# Patient Record
Sex: MALE | Race: WHITE | NOT HISPANIC OR LATINO | Employment: OTHER | URBAN - METROPOLITAN AREA
[De-identification: names, ages, dates, MRNs, and addresses within clinical notes are randomized per-mention and may not be internally consistent; named-entity substitution may affect disease eponyms.]

---

## 2017-10-28 PROBLEM — T31.0 BURN (ANY DEGREE) INVOLVING LESS THAN 10% OF BODY SURFACE: Status: ACTIVE | Noted: 2017-10-28

## 2021-06-30 ENCOUNTER — OFFICE VISIT (OUTPATIENT)
Dept: OPTOMETRY | Facility: CLINIC | Age: 61
End: 2021-06-30
Payer: COMMERCIAL

## 2021-06-30 DIAGNOSIS — H01.024 SQUAMOUS BLEPHARITIS OF UPPER EYELIDS OF BOTH EYES: ICD-10-CM

## 2021-06-30 DIAGNOSIS — H01.021 SQUAMOUS BLEPHARITIS OF UPPER EYELIDS OF BOTH EYES: ICD-10-CM

## 2021-06-30 DIAGNOSIS — H04.123 DRY EYE SYNDROME OF BOTH EYES: Primary | ICD-10-CM

## 2021-06-30 DIAGNOSIS — H25.13 NUCLEAR SCLEROSIS OF BOTH EYES: ICD-10-CM

## 2021-06-30 DIAGNOSIS — H52.4 PRESBYOPIA: ICD-10-CM

## 2021-06-30 PROCEDURE — 92015 DETERMINE REFRACTIVE STATE: CPT | Mod: S$GLB,,, | Performed by: OPTOMETRIST

## 2021-06-30 PROCEDURE — 92004 PR EYE EXAM, NEW PATIENT,COMPREHESV: ICD-10-PCS | Mod: S$GLB,,, | Performed by: OPTOMETRIST

## 2021-06-30 PROCEDURE — 99999 PR PBB SHADOW E&M-NEW PATIENT-LVL II: ICD-10-PCS | Mod: PBBFAC,,, | Performed by: OPTOMETRIST

## 2021-06-30 PROCEDURE — 92015 PR REFRACTION: ICD-10-PCS | Mod: S$GLB,,, | Performed by: OPTOMETRIST

## 2021-06-30 PROCEDURE — 99999 PR PBB SHADOW E&M-NEW PATIENT-LVL II: CPT | Mod: PBBFAC,,, | Performed by: OPTOMETRIST

## 2021-06-30 PROCEDURE — 92004 COMPRE OPH EXAM NEW PT 1/>: CPT | Mod: S$GLB,,, | Performed by: OPTOMETRIST

## 2024-03-18 ENCOUNTER — LAB VISIT (OUTPATIENT)
Dept: LAB | Facility: HOSPITAL | Age: 64
End: 2024-03-18
Attending: INTERNAL MEDICINE
Payer: COMMERCIAL

## 2024-03-18 ENCOUNTER — OFFICE VISIT (OUTPATIENT)
Dept: PRIMARY CARE CLINIC | Facility: CLINIC | Age: 64
End: 2024-03-18
Payer: COMMERCIAL

## 2024-03-18 ENCOUNTER — TELEPHONE (OUTPATIENT)
Dept: PRIMARY CARE CLINIC | Facility: CLINIC | Age: 64
End: 2024-03-18

## 2024-03-18 VITALS
HEIGHT: 67 IN | DIASTOLIC BLOOD PRESSURE: 82 MMHG | HEART RATE: 76 BPM | OXYGEN SATURATION: 96 % | BODY MASS INDEX: 27.86 KG/M2 | WEIGHT: 177.5 LBS | TEMPERATURE: 98 F | RESPIRATION RATE: 18 BRPM | SYSTOLIC BLOOD PRESSURE: 120 MMHG

## 2024-03-18 DIAGNOSIS — N28.89 RENAL MASS: ICD-10-CM

## 2024-03-18 DIAGNOSIS — Z00.00 ANNUAL PHYSICAL EXAM: Primary | ICD-10-CM

## 2024-03-18 DIAGNOSIS — N20.0 RENAL STONE: ICD-10-CM

## 2024-03-18 DIAGNOSIS — Z12.5 PROSTATE CANCER SCREENING: ICD-10-CM

## 2024-03-18 DIAGNOSIS — Z00.00 ANNUAL PHYSICAL EXAM: ICD-10-CM

## 2024-03-18 DIAGNOSIS — N20.0 RENAL STONE: Primary | ICD-10-CM

## 2024-03-18 LAB
ALBUMIN SERPL BCP-MCNC: 3.9 G/DL (ref 3.5–5.2)
ALP SERPL-CCNC: 77 U/L (ref 55–135)
ALT SERPL W/O P-5'-P-CCNC: 35 U/L (ref 10–44)
ANION GAP SERPL CALC-SCNC: 8 MMOL/L (ref 8–16)
AST SERPL-CCNC: 24 U/L (ref 10–40)
BASOPHILS # BLD AUTO: 0.05 K/UL (ref 0–0.2)
BASOPHILS NFR BLD: 0.8 % (ref 0–1.9)
BILIRUB SERPL-MCNC: 0.7 MG/DL (ref 0.1–1)
BUN SERPL-MCNC: 20 MG/DL (ref 8–23)
CALCIUM SERPL-MCNC: 9.7 MG/DL (ref 8.7–10.5)
CHLORIDE SERPL-SCNC: 105 MMOL/L (ref 95–110)
CHOLEST SERPL-MCNC: 192 MG/DL (ref 120–199)
CHOLEST/HDLC SERPL: 5.3 {RATIO} (ref 2–5)
CO2 SERPL-SCNC: 26 MMOL/L (ref 23–29)
COMPLEXED PSA SERPL-MCNC: 4.1 NG/ML (ref 0–4)
CREAT SERPL-MCNC: 1.2 MG/DL (ref 0.5–1.4)
DIFFERENTIAL METHOD BLD: ABNORMAL
EOSINOPHIL # BLD AUTO: 0.1 K/UL (ref 0–0.5)
EOSINOPHIL NFR BLD: 1.1 % (ref 0–8)
ERYTHROCYTE [DISTWIDTH] IN BLOOD BY AUTOMATED COUNT: 11.5 % (ref 11.5–14.5)
EST. GFR  (NO RACE VARIABLE): >60 ML/MIN/1.73 M^2
GLUCOSE SERPL-MCNC: 103 MG/DL (ref 70–110)
HCT VFR BLD AUTO: 50.2 % (ref 40–54)
HDLC SERPL-MCNC: 36 MG/DL (ref 40–75)
HDLC SERPL: 18.8 % (ref 20–50)
HGB BLD-MCNC: 17 G/DL (ref 14–18)
IMM GRANULOCYTES # BLD AUTO: 0.02 K/UL (ref 0–0.04)
IMM GRANULOCYTES NFR BLD AUTO: 0.3 % (ref 0–0.5)
LDLC SERPL CALC-MCNC: 133.2 MG/DL (ref 63–159)
LYMPHOCYTES # BLD AUTO: 1 K/UL (ref 1–4.8)
LYMPHOCYTES NFR BLD: 15.4 % (ref 18–48)
MCH RBC QN AUTO: 31.4 PG (ref 27–31)
MCHC RBC AUTO-ENTMCNC: 33.9 G/DL (ref 32–36)
MCV RBC AUTO: 93 FL (ref 82–98)
MONOCYTES # BLD AUTO: 0.4 K/UL (ref 0.3–1)
MONOCYTES NFR BLD: 6.9 % (ref 4–15)
NEUTROPHILS # BLD AUTO: 4.8 K/UL (ref 1.8–7.7)
NEUTROPHILS NFR BLD: 75.5 % (ref 38–73)
NONHDLC SERPL-MCNC: 156 MG/DL
NRBC BLD-RTO: 0 /100 WBC
PLATELET # BLD AUTO: 166 K/UL (ref 150–450)
PMV BLD AUTO: 11.3 FL (ref 9.2–12.9)
POTASSIUM SERPL-SCNC: 4.3 MMOL/L (ref 3.5–5.1)
PROT SERPL-MCNC: 7.4 G/DL (ref 6–8.4)
RBC # BLD AUTO: 5.41 M/UL (ref 4.6–6.2)
SODIUM SERPL-SCNC: 139 MMOL/L (ref 136–145)
TRIGL SERPL-MCNC: 114 MG/DL (ref 30–150)
WBC # BLD AUTO: 6.38 K/UL (ref 3.9–12.7)

## 2024-03-18 PROCEDURE — 85025 COMPLETE CBC W/AUTO DIFF WBC: CPT | Performed by: INTERNAL MEDICINE

## 2024-03-18 PROCEDURE — 80053 COMPREHEN METABOLIC PANEL: CPT | Performed by: INTERNAL MEDICINE

## 2024-03-18 PROCEDURE — 99213 OFFICE O/P EST LOW 20 MIN: CPT | Mod: 25,S$GLB,, | Performed by: INTERNAL MEDICINE

## 2024-03-18 PROCEDURE — 99999 PR PBB SHADOW E&M-EST. PATIENT-LVL IV: CPT | Mod: PBBFAC,,, | Performed by: INTERNAL MEDICINE

## 2024-03-18 PROCEDURE — 99386 PREV VISIT NEW AGE 40-64: CPT | Mod: S$GLB,,, | Performed by: INTERNAL MEDICINE

## 2024-03-18 PROCEDURE — 80061 LIPID PANEL: CPT | Performed by: INTERNAL MEDICINE

## 2024-03-18 PROCEDURE — 36415 COLL VENOUS BLD VENIPUNCTURE: CPT | Performed by: INTERNAL MEDICINE

## 2024-03-18 PROCEDURE — 84153 ASSAY OF PSA TOTAL: CPT | Performed by: INTERNAL MEDICINE

## 2024-03-18 NOTE — LETTER
AUTHORIZATION FOR RELEASE OF   CONFIDENTIAL INFORMATION    Dear Dr Petit ,    We are seeing Lopez Quigley, date of birth 1960, in the clinic at [facility]. Pankaj Winters MD is the patient's PCP. Lopez Quigley has an outstanding lab/procedure at the time we reviewed his chart. In order to help keep his health information updated, he has authorized us to request the following medical record(s):        (  )  MAMMOGRAM                                      ( x )  COLONOSCOPY      (  )  PAP SMEAR                                          (  )  OUTSIDE LAB RESULTS     (  )  DEXA SCAN                                          (  )  EYE EXAM            (  )  FOOT EXAM                                          (  )  ENTIRE RECORD     (  )  OUTSIDE IMMUNIZATIONS                 (  )  _______________         Please fax records to [facility], Dantagnan, Frederick W., MD, [phone]     If you have any questions, please contact [facility] at [phone].           Patient Name: Lopez Quigley  : 1960  Patient Phone #: [phone]

## 2024-03-18 NOTE — TELEPHONE ENCOUNTER
----- Message from Citlaly Barbour sent at 3/18/2024  2:45 PM CDT -----  Contact: Ted/  Patient would like to get a referral.  Referral to what specialty:  Urology  Does the patient want the referral with a specific physician: Dr Augusto Tariq   Is the [facility]  Reason (be specific):  Renal stone, renal mass  Does the patient already have the specialty clinic appointment scheduled: yes  If yes, what date is the appointment scheduled:   03/19/24  Is the insurance listed in Epic correct? (this is important for a referral):  yes  Advised patient that once provider approves this either a nurse or  will return their call?:   Would the patient like a call back, or a response through their [facility] portal?:   phone  Comments:

## 2024-03-18 NOTE — PROGRESS NOTES
[facility] Note    Chief Complaint      Chief Complaint   Patient presents with    Hospital Follow Up    Establish Care       History of Present Illness      Lopez Quigley is a 63 y.o. male who presents today for   Chief Complaint   Patient presents with    Hospital Follow Up    Establish Care   He is here for annual preventative visit with mednichole need full screening labs with this visit . He had colonoscopy with dr bustos in past . He is normally very active with adls and is eating healthy diet as well     Also er f/u for newly diagnosed kidney stone with mild hydronephrosis . Also xray revelaed a 2 cm lesion in inferrior pole of left kidney .         HPI    No problem-specific Assessment & Plan notes found for this encounter.       Problem List Items Addressed This Visit          Renal/    Renal stone    Overview     Cont hydration and urine straining , refer to urology          Renal mass    Overview     Needs referral to urology for eval and treat             Other    Annual physical exam - Primary    Overview     Pe documented need sfull screening labs          Other Visit Diagnoses       Prostate cancer screening                 Past Medical History:  History reviewed. No pertinent past medical history.    Past Surgical History:  History reviewed. No pertinent surgical history.    Family History:  family history is not on file.     Social History:  Social History     Socioeconomic History    Marital status:    Tobacco Use    Smoking status: Never    Smokeless tobacco: Never   Substance and Sexual Activity    Alcohol use: No    Drug use: No    Sexual activity: Yes       Review of Systems:   Review of Systems   Constitutional:  Negative for fever and weight loss.   HENT:  Negative for congestion, hearing loss and sore throat.    Eyes:  Negative for blurred vision.   Respiratory:  Negative for cough and shortness of breath.    Cardiovascular:  Negative for chest pain,  "palpitations, claudication and leg swelling.   Gastrointestinal:  Negative for abdominal pain, constipation, diarrhea and heartburn.   Genitourinary:  Positive for flank pain. Negative for dysuria.   Musculoskeletal:  Negative for back pain and myalgias.   Skin:  Negative for rash.   Neurological:  Negative for focal weakness and headaches.   Psychiatric/Behavioral:  Negative for depression and suicidal ideas. The patient is not nervous/anxious.         Medications:  Outpatient Encounter Medications as of 3/18/2024   Medication Sig Dispense Refill    ketorolac (TORADOL) 10 mg tablet Take 1 tablet (10 mg total) by mouth every 8 (eight) hours as needed for Pain. 15 tablet 0    [DISCONTINUED] meclizine (ANTIVERT) 25 mg tablet Take 1 tablet (25 mg total) by mouth 3 (three) times daily as needed. (Patient not taking: Reported on 3/18/2024) 20 tablet 0    [DISCONTINUED] tamsulosin (FLOMAX) 0.4 mg Cap Take 1 capsule (0.4 mg total) by mouth once daily. (Patient not taking: Reported on 3/18/2024) 30 capsule 0     No facility-administered encounter medications on file as of 3/18/2024.       Allergies:  Review of patient's allergies indicates:  No Known Allergies      Physical Exam      Vitals:    03/18/24 0922   BP: 120/82   Pulse: 76   Resp: 18   Temp: 98 °F (36.7 °C)        Vital Signs  Temp: 98 °F (36.7 °C)  Temp Source: Oral  Pulse: 76  Resp: 18  SpO2: 96 %  BP: 120/82  BP Location: Left arm  Patient Position: Sitting  Pain Score: 0-No pain  Height and Weight  Height: 5' 7" (170.2 cm)  Weight: 80.5 kg (177 lb 7.5 oz)  BSA (Calculated - sq m): 1.95 sq meters  BMI (Calculated): 27.8  Weight in (lb) to have BMI = 25: 159.3]     Body mass index is 27.8 kg/m².    Physical Exam  Constitutional:       Appearance: He is well-developed.   HENT:      Head: Normocephalic.   Eyes:      Pupils: Pupils are equal, round, and reactive to light.   Neck:      Thyroid: No thyromegaly.   Cardiovascular:      Rate and Rhythm: Normal rate and " "regular rhythm.      Heart sounds: No murmur heard.     No friction rub. No gallop.   Pulmonary:      Effort: Pulmonary effort is normal.      Breath sounds: Normal breath sounds.   Abdominal:      General: Bowel sounds are normal.      Palpations: Abdomen is soft.   Musculoskeletal:         General: Normal range of motion.      Cervical back: Normal range of motion.   Skin:     General: Skin is warm and dry.   Neurological:      Mental Status: He is alert and oriented to person, place, and time.      Sensory: No sensory deficit.   Psychiatric:         Behavior: Behavior normal.          Laboratory:  CBC:  No results for input(s): "WBC", "RBC", "HGB", "HCT", "PLT", "MCV", "MCH", "MCHC" in the last 2160 hours.  CMP:  No results for input(s): "GLU", "CALCIUM", "ALBUMIN", "PROT", "NA", "K", "CO2", "CL", "BUN", "ALKPHOS", "ALT", "AST", "BILITOT" in the last 2160 hours.    Invalid input(s): "CREATININ"  URINALYSIS:  Recent Labs   Lab Result Units 03/12/24  2306   Color, UA  Yellow   Specific Gravity, UA  1.020   pH, UA  7.0   Protein, UA  Negative   Nitrite, UA  Negative   Leukocytes, UA  Negative   Urobilinogen, UA EU/dL 2.0-3.0*      LIPIDS:  No results for input(s): "TSH", "HDL", "CHOL", "TRIG", "LDLCALC", "CHOLHDL", "NONHDLCHOL", "TOTALCHOLEST" in the last 2160 hours.  TSH:  No results for input(s): "TSH" in the last 2160 hours.  A1C:  No results for input(s): "HGBA1C" in the last 2160 hours.    Radiology:        Assessment:     Lopez Quigley is a 63 y.o.male with:    Annual physical exam  -     CBC Auto Differential; Future; Expected date: 03/18/2024  -     Comprehensive Metabolic Panel; Future; Expected date: 03/18/2024  -     Lipid Panel; Future; Expected date: 03/18/2024    Renal stone  -     Ambulatory referral/consult to Urology; Future; Expected date: 03/25/2024    Renal mass  -     Ambulatory referral/consult to Urology; Future; Expected date: 03/25/2024    Prostate cancer screening  -     PSA, Screening; " Future; Expected date: 03/18/2024                Plan:     Problem List Items Addressed This Visit          Renal/    Renal stone    Overview     Cont hydration and urine straining , refer to urology          Renal mass    Overview     Needs referral to urology for eval and treat             Other    Annual physical exam - Primary    Overview     Pe documented need sfull screening labs          Other Visit Diagnoses       Prostate cancer screening                As above, continue current medications and maintain follow up with specialists.  Return to clinic in 6 months.      Frederick W Dantagnan [facility]

## 2024-03-19 ENCOUNTER — OFFICE VISIT (OUTPATIENT)
Dept: UROLOGY | Facility: CLINIC | Age: 64
End: 2024-03-19
Payer: COMMERCIAL

## 2024-03-19 ENCOUNTER — PATIENT MESSAGE (OUTPATIENT)
Dept: ADMINISTRATIVE | Facility: HOSPITAL | Age: 64
End: 2024-03-19
Payer: COMMERCIAL

## 2024-03-19 VITALS
DIASTOLIC BLOOD PRESSURE: 87 MMHG | WEIGHT: 178.44 LBS | HEIGHT: 67 IN | SYSTOLIC BLOOD PRESSURE: 145 MMHG | HEART RATE: 61 BPM | BODY MASS INDEX: 28.01 KG/M2

## 2024-03-19 DIAGNOSIS — N20.1 LEFT URETERAL STONE: Primary | ICD-10-CM

## 2024-03-19 DIAGNOSIS — N28.9 RENAL LESION: ICD-10-CM

## 2024-03-19 DIAGNOSIS — N28.89 RENAL MASS: ICD-10-CM

## 2024-03-19 DIAGNOSIS — N20.0 RENAL STONE: ICD-10-CM

## 2024-03-19 PROCEDURE — 99999 PR PBB SHADOW E&M-EST. PATIENT-LVL IV: CPT | Mod: PBBFAC,,, | Performed by: UROLOGY

## 2024-03-19 PROCEDURE — 87086 URINE CULTURE/COLONY COUNT: CPT | Performed by: UROLOGY

## 2024-03-19 PROCEDURE — 99204 OFFICE O/P NEW MOD 45 MIN: CPT | Mod: S$GLB,,, | Performed by: UROLOGY

## 2024-03-19 NOTE — PROGRESS NOTES
Subjective:       Patient ID: Lopez Quigley is a 63 y.o. male.    Chief Complaint: Renal mass and Nephrolithiasis     This is a 63 y.o.  male patient that is new to me.  The patient was referred to me by PCP for left renal mass, left ureteral stone.  CT without contrast 3/12/24 with left 2 mm UVJ stone with mild hydro, left 2.2 cm renal lesion.  Right-sided small nonobstructing stones.  Intermittent left-sided flank discomfort.  No fever or chills.  Labs 03/18/24 creatinine okay at 1.2, white blood cell count normal.  PSA was 4.1.    LAST PSA  Lab Results   Component Value Date    PSA 4.1 (H) 03/18/2024       Lab Results   Component Value Date    CREATININE 1.2 03/18/2024       ---  PMH/PSH/Medications/Allergies/Social history reviewed and as in chart.    Review of Systems   Constitutional:  Negative for activity change, chills and fever.   HENT:  Negative for congestion.    Respiratory:  Negative for cough, chest tightness and shortness of breath.    Cardiovascular:  Negative for chest pain and palpitations.   Gastrointestinal:  Negative for abdominal distention, abdominal pain, nausea and vomiting.   Genitourinary:  Positive for flank pain. Negative for difficulty urinating, hematuria, penile pain, scrotal swelling and testicular pain.   Musculoskeletal:  Negative for gait problem.       Objective:      Physical Exam  Constitutional:       Appearance: Normal appearance.   HENT:      Head: Normocephalic.   Pulmonary:      Effort: Pulmonary effort is normal.      Breath sounds: Normal breath sounds.   Abdominal:      General: Abdomen is flat. Bowel sounds are normal.      Palpations: Abdomen is soft.      Tenderness: There is no abdominal tenderness. There is no right CVA tenderness, left CVA tenderness or guarding.   Musculoskeletal:         General: Normal range of motion.      Cervical back: Normal range of motion.   Skin:     General: Skin is warm.   Neurological:      Mental Status: He is alert.            Results for orders placed or performed during the hospital encounter of 03/12/24 (from the past 2160 hour(s))   CT Renal Stone Study ABD Pelvis WO    Narrative    EXAMINATION:  CT RENAL STONE STUDY ABD PELVIS WO    CLINICAL HISTORY:  Flank pain, kidney stone suspected;    TECHNIQUE:  Low dose axial images, sagittal and coronal reformations were obtained from the lung bases to the pubic symphysis, Oral contrast was not administered.    COMPARISON:  None    FINDINGS:  Lower chest: Gynecomastia.    Heart: Normal in size. No pericardial effusion.  Moderate coronary artery calcification.    Lung Bases: Well aerated, without consolidation or pleural fluid.    Liver: Normal in size and attenuation, with no focal hepatic lesions.    Gallbladder: No calcified gallstones.    Bile Ducts: No evidence of dilated ducts.    Pancreas: No mass or peripancreatic fat stranding.    Spleen: Unremarkable.    Adrenals: Unremarkable.    Kidneys/ Ureters: 2 mm obstructive stone at the left UVJ with resultant mild left-sided hydroureteronephrosis.  Heterogeneous 2.2 x 1.7 cm rounded lesion at the inferior pole left kidney.  Nonobstructive right nephrolithiasis.  Mild bilateral infectious or inflammatory left perinephric fat stranding, left greater than right.    Bladder: No evidence of wall thickening.    Reproductive organs: Enlarged prostate gland abuts the base  of the bladder.    GI Tract/Mesentery: Colonic diverticulosis, without diverticulitis.  No small bowel obstruction.    Peritoneal Space: No ascites. No free air.    Retroperitoneum: No significant adenopathy.    Abdominal wall: Small fat containing umbilical hernia.  Small bilateral fat containing inguinal hernias.    Vasculature: No significant atherosclerosis or aneurysm.    Bones: No acute fracture.      Impression    2 mm obstructive stone at the left UVJ with resultant mild left-sided hydroureteronephrosis.    Heterogeneous 2.2 x 1.7 cm rounded lesion at the inferior pole  left kidney.  Recommend CT or MR renal mass protocol to exclude solid neoplasm.    Nonobstructive right nephrolithiasis.    Mild bilateral infectious or inflammatory left perinephric fat stranding, left greater than right.      Electronically signed by: River-Chris Jennifer  Date:    03/12/2024  Time:    21:50       Assessment:     Problem Noted   Left Ureteral Stone 3/19/2024   Renal Lesion 3/18/2024    2.2 cm left on CT noncontrast 3/24         Plan:     CT reviewed with the patient, small right nonobstructing stones, left distal ureteral stone with mild hydronephrosis, left renal lesion.  Discussed options for small stone:  Ureteroscopy versus observation.  We will need repeat CT scan for left renal lesion.  Plan for CT scan with and without contrast in approximately 1 week with follow up after.  Repeat PSA sometime in future, possibly elevated due to left distal ureteral stone.      David Wong MD    The above referenced imaging and interpretations were personally reviewed.  Disclaimer: This note has been generated using voice-recognition software. There may be typographical errors that have been missed during proof-reading.

## 2024-03-20 DIAGNOSIS — Z12.11 SCREENING FOR COLON CANCER: ICD-10-CM

## 2024-03-20 LAB — BACTERIA UR CULT: NO GROWTH

## 2024-03-27 ENCOUNTER — OFFICE VISIT (OUTPATIENT)
Dept: UROLOGY | Facility: CLINIC | Age: 64
End: 2024-03-27
Payer: COMMERCIAL

## 2024-03-27 ENCOUNTER — PATIENT MESSAGE (OUTPATIENT)
Dept: UROLOGY | Facility: CLINIC | Age: 64
End: 2024-03-27

## 2024-03-27 VITALS
BODY MASS INDEX: 27.3 KG/M2 | HEIGHT: 67 IN | DIASTOLIC BLOOD PRESSURE: 75 MMHG | SYSTOLIC BLOOD PRESSURE: 117 MMHG | WEIGHT: 173.94 LBS | HEART RATE: 65 BPM

## 2024-03-27 DIAGNOSIS — N28.89 LEFT RENAL MASS: Primary | ICD-10-CM

## 2024-03-27 PROCEDURE — 99999 PR PBB SHADOW E&M-EST. PATIENT-LVL III: CPT | Mod: PBBFAC,,, | Performed by: UROLOGY

## 2024-03-27 PROCEDURE — 99215 OFFICE O/P EST HI 40 MIN: CPT | Mod: S$GLB,,, | Performed by: UROLOGY

## 2024-03-27 PROCEDURE — 86850 RBC ANTIBODY SCREEN: CPT | Performed by: UROLOGY

## 2024-03-27 NOTE — PROGRESS NOTES
Subjective:       Patient ID: Lopez Quigley is a 63 y.o. male.    Chief Complaint: Follow-up (CT results)     This is a 63 y.o.  male patient with left renal mass, kidney stones.  CT without contrast 3/12/24 with left 2 mm UVJ stone with mild hydro, left 2.2 cm renal lesion.  Right-sided small nonobstructing stones.  Intermittent left-sided flank discomfort.  No fever or chills.  Labs 03/18/24 creatinine okay at 1.2, white blood cell count normal.  PSA was 4.1.  CT w/wo contrast 3/26/24 with left 2 cm renal mass, no left ureteral stone or hydronephrosis, right non obstructing small stones.    Still intermittent left lower abdominal cramping.      LAST PSA  Lab Results   Component Value Date    PSA 4.1 (H) 03/18/2024       Lab Results   Component Value Date    CREATININE 1.2 03/18/2024       ---  PMH/PSH/Medications/Allergies/Social history reviewed and as in chart.    Review of Systems   Constitutional:  Negative for activity change, chills and fever.   HENT:  Negative for congestion.    Respiratory:  Negative for cough, chest tightness and shortness of breath.    Cardiovascular:  Negative for chest pain and palpitations.   Gastrointestinal:  Negative for abdominal distention, abdominal pain, nausea and vomiting.   Genitourinary:  Positive for flank pain. Negative for difficulty urinating, hematuria, penile pain, scrotal swelling and testicular pain.   Musculoskeletal:  Negative for gait problem.       Objective:      Physical Exam  Constitutional:       Appearance: Normal appearance.   HENT:      Head: Normocephalic.   Pulmonary:      Effort: Pulmonary effort is normal.      Breath sounds: Normal breath sounds.   Abdominal:      General: Abdomen is flat. Bowel sounds are normal.      Palpations: Abdomen is soft.      Tenderness: There is no abdominal tenderness. There is no right CVA tenderness, left CVA tenderness or guarding.   Musculoskeletal:         General: Normal range of motion.      Cervical back:  Normal range of motion.   Skin:     General: Skin is warm.   Neurological:      Mental Status: He is alert.           Results for orders placed or performed during the hospital encounter of 03/26/24 (from the past 2160 hour(s))   CT Abdomen Pelvis W Wo Contrast    Narrative    EXAM:  CT ABDOMEN PELVIS W WO CONTRAST    CLINICAL HISTORY:          left renal lesion, left ureteral stone; [N20.1]-Calculus of ureter./[N28.9]-Disorder of kidney and ureter, unspecified.    TECHNIQUE: Helical CT images through the abdomen and pelvis were obtained before and after intravenous administration of  iodinated contrast. All CT scans at [this location] are performed using dose modulation techniques as appropriate to a performed exam including the following: automated exposure control; adjustment of the mA and/or kV according to patient size (this includes techniques or standardized protocols for targeted exams where dose is matched to indication / reason for exam; i.e. extremities or head); use of iterative reconstruction technique.    COMPARISON: None.    FINDINGS:    Lower chest: No significant abnormality.  Liver: Satisfactory appearance of the liver.  Gallbladder: Unremarkable gallbladder.  Biliary: No significant biliary ductal dilatation.  Pancreas: No significant abnormality of the pancreas, adrenal glands, and spleen.  Adrenal glands: Unremarkable.  Spleen: Unremarkable.  Kidneys/ureters: Cystic and solid exophytic 2.5 x 2.2 cm left renal mass anterior lower pole.  There is a too small to characterize hypodensity in the right kidney statistically representing a cyst.  No hydronephrosis on either side.  There are 2 nonobstructing right renal calculi, the largest measures 2 mm.  No renal calculus on the left.  No hydronephrosis on either side.  No urothelial lesion in the renal pelves or proximal ureters.  The distal ureters and bladder are not included on the delayed imaging.  Bladder: Grossly unremarkable.  Reproductive:  Prostate is enlarged, 5.1 cm.  Bilateral scrotal hydroceles.  Probable vasectomy clips.  Gastrointestinal: No bowel obstruction or inflammation. No intraperitoneal free air or significant free fluid.  The appendix has a normal appearance.  Retroperitoneum: No adenopathy.  Vascular: Nonaneurysmal aorta.  No acute vascular abnormality.  Osseous: No acute fracture or aggressive appearing osseous lesion. Degenerative disc disease most pronounced at the L3-L4 and L4-L5 levels.        Impression    1.    Enhancing cystic and solid left renal mass measures 2.5 x 2.2 cm.  Finding is consistent with malignancy.  No evidence of distal metastasis in the abdomen or pelvis.  2.    Small bilateral scrotal hydroceles.  3.    Other findings as above.    Finalized on: 3/26/2024 11:28 AM By:  Carmelo Hernandez MD  [facility]# [id number]      2024-03-26 11:30:36.957    [facility]   Results for orders placed or performed during the hospital encounter of 03/12/24 (from the past 2160 hour(s))   CT Renal Stone Study ABD Pelvis WO    Narrative    EXAMINATION:  CT RENAL STONE STUDY ABD PELVIS WO    CLINICAL HISTORY:  Flank pain, kidney stone suspected;    TECHNIQUE:  Low dose axial images, sagittal and coronal reformations were obtained from the lung bases to the pubic symphysis, Oral contrast was not administered.    COMPARISON:  None    FINDINGS:  Lower chest: Gynecomastia.    Heart: Normal in size. No pericardial effusion.  Moderate coronary artery calcification.    Lung Bases: Well aerated, without consolidation or pleural fluid.    Liver: Normal in size and attenuation, with no focal hepatic lesions.    Gallbladder: No calcified gallstones.    Bile Ducts: No evidence of dilated ducts.    Pancreas: No mass or peripancreatic fat stranding.    Spleen: Unremarkable.    Adrenals: Unremarkable.    Kidneys/ Ureters: 2 mm obstructive stone at the left UVJ with resultant mild left-sided hydroureteronephrosis.  Heterogeneous 2.2 x 1.7 cm rounded lesion at the  inferior pole left kidney.  Nonobstructive right nephrolithiasis.  Mild bilateral infectious or inflammatory left perinephric fat stranding, left greater than right.    Bladder: No evidence of wall thickening.    Reproductive organs: Enlarged prostate gland abuts the base  of the bladder.    GI Tract/Mesentery: Colonic diverticulosis, without diverticulitis.  No small bowel obstruction.    Peritoneal Space: No ascites. No free air.    Retroperitoneum: No significant adenopathy.    Abdominal wall: Small fat containing umbilical hernia.  Small bilateral fat containing inguinal hernias.    Vasculature: No significant atherosclerosis or aneurysm.    Bones: No acute fracture.      Impression    2 mm obstructive stone at the left UVJ with resultant mild left-sided hydroureteronephrosis.    Heterogeneous 2.2 x 1.7 cm rounded lesion at the inferior pole left kidney.  Recommend CT or MR renal mass protocol to exclude solid neoplasm.    Nonobstructive right nephrolithiasis.    Mild bilateral infectious or inflammatory left perinephric fat stranding, left greater than right.      Electronically signed by: Lisa Rodriguez  Date:    03/12/2024  Time:    21:50       Assessment:     Problem Noted   Left Ureteral Stone 3/19/2024   Left Renal Mass 3/18/2024    2.2 cm left on CT noncontrast 3/24  2.5 cm enhancing cystic and solid mass on CT abd w/wo contrast 3/26/24         Plan:     CT reviewed with the patient, left ureteral stone no longer present, no left hydronephrosis.  2.5 cm left renal mass.    Discussed finding of left small renal mass <3 cm.  Options discussed: observation, ablation/biopsy, extirpation with robotic partial nephrectomy.  Risks, benefits and alternatives of each discussed.  Discussed chances of tumor being benign, follow up after each treatment, success rates, etc.   Given age, health status and tumor characteristics recommend partial nephrectomy.  Wishes to proceed.  To let me know when wish to have  done      I have explained the risk, benefits, and alternatives of the procedure in detail.  The risks including but not limited to bleeding, pseudoaneurysm, AVM, injury to adjacent structures including the spleen, liver, lung, pancreas, colon and intestines, blood vessels in the abdomen including the renal artery or renal vein, ureter, loss of kidney, urinoma or urinary fistula, or need for conversion to open or radical nephrectomy were explained to the patient in depth. The patient voices understanding and all questions have been answered. The patient agrees to proceed as planned with a left robotic partial nephrectomy.        David Wong MD    The above referenced imaging and interpretations were personally reviewed.  Disclaimer: This note has been generated using voice-recognition software. There may be typographical errors that have been missed during proof-reading.

## 2024-03-28 RX ORDER — CEFAZOLIN SODIUM 2 G/50ML
2 SOLUTION INTRAVENOUS
Status: CANCELLED | OUTPATIENT
Start: 2024-03-28

## 2024-03-28 RX ORDER — ACETAMINOPHEN 500 MG
1000 TABLET ORAL
Status: CANCELLED | OUTPATIENT
Start: 2024-03-28

## 2024-03-28 RX ORDER — LIDOCAINE HYDROCHLORIDE 10 MG/ML
1 INJECTION, SOLUTION EPIDURAL; INFILTRATION; INTRACAUDAL; PERINEURAL ONCE
Status: CANCELLED | OUTPATIENT
Start: 2024-03-28 | End: 2024-03-28

## 2024-04-02 LAB — HEMOCCULT STL QL IA: NEGATIVE

## 2024-04-03 ENCOUNTER — TELEPHONE (OUTPATIENT)
Dept: UROLOGY | Facility: CLINIC | Age: 64
End: 2024-04-03
Payer: COMMERCIAL

## 2024-04-03 NOTE — TELEPHONE ENCOUNTER
----- Message from Ankit Sahni sent at 4/2/2024 11:36 AM CDT -----  Contact: pt  Type:  Procedure change    Who Called: pt   Would the patient rather a call back or a response via [facility]? Call/ Eviti  Best Call Back Number: [phone]  Additional Information:    Pt requesting a call regarding change procedure to Monday April 8th

## 2024-04-03 NOTE — TELEPHONE ENCOUNTER
----- Message from Consuelo Jose Rafael sent at 4/3/2024 10:07 AM CDT -----  Type:  Call    Who Called:pt  Does the patient know what this is regarding?:messages in portal  Would the patient rather a call back or a response via [facility]? call  Best Call Back Number: [phone]  Additional Information: Pt stated she needs messages sent through the portal answered.

## 2024-04-29 ENCOUNTER — PATIENT MESSAGE (OUTPATIENT)
Dept: UROLOGY | Facility: CLINIC | Age: 64
End: 2024-04-29
Payer: COMMERCIAL

## 2024-04-30 ENCOUNTER — TELEPHONE (OUTPATIENT)
Dept: PREADMISSION TESTING | Facility: HOSPITAL | Age: 64
End: 2024-04-30
Payer: COMMERCIAL

## 2024-04-30 NOTE — TELEPHONE ENCOUNTER
Spoke to the patient and his wife regarding issues with Anesthesia in the past. He has trouble waking up from anesthesia. The type of anesthesia was propofol. Instructed he would see anesthesia the morning of surgery and this information would be given to Sharel NP with anesthesia. No further questions.

## 2024-05-01 ENCOUNTER — ANESTHESIA EVENT (OUTPATIENT)
Dept: SURGERY | Facility: HOSPITAL | Age: 64
DRG: 658 | End: 2024-05-01
Payer: COMMERCIAL

## 2024-05-01 LAB
OHS QRS DURATION: 84 MS
OHS QTC CALCULATION: 420 MS
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
## 2024-05-02 ENCOUNTER — HOSPITAL ENCOUNTER (INPATIENT)
Facility: HOSPITAL | Age: 64
LOS: 1 days | Discharge: HOME | DRG: 658 | End: 2024-05-03
Attending: UROLOGY | Admitting: UROLOGY
Payer: COMMERCIAL

## 2024-05-02 ENCOUNTER — ANESTHESIA (OUTPATIENT)
Dept: SURGERY | Facility: HOSPITAL | Age: 64
DRG: 658 | End: 2024-05-02
Payer: COMMERCIAL

## 2024-05-02 DIAGNOSIS — N28.89 LEFT RENAL MASS: Primary | ICD-10-CM

## 2024-05-02 LAB
ABO + RH BLD: NORMAL
ANION GAP SERPL CALC-SCNC: 13 MMOL/L (ref 8–16)
BASOPHILS # BLD AUTO: 0.02 K/UL (ref 0–0.2)
BASOPHILS NFR BLD: 0.2 % (ref 0–1.9)
BLD GP AB SCN CELLS X3 SERPL QL: NORMAL
BUN SERPL-MCNC: 15 MG/DL (ref 8–23)
CALCIUM SERPL-MCNC: 8.3 MG/DL (ref 8.7–10.5)
CHLORIDE SERPL-SCNC: 107 MMOL/L (ref 95–110)
CO2 SERPL-SCNC: 21 MMOL/L (ref 23–29)
CREAT SERPL-MCNC: 1.2 MG/DL (ref 0.5–1.4)
DIFFERENTIAL METHOD BLD: ABNORMAL
EOSINOPHIL # BLD AUTO: 0 K/UL (ref 0–0.5)
EOSINOPHIL NFR BLD: 0 % (ref 0–8)
ERYTHROCYTE [DISTWIDTH] IN BLOOD BY AUTOMATED COUNT: 11.6 % (ref 11.5–14.5)
EST. GFR  (NO RACE VARIABLE): >60 ML/MIN/1.73 M^2
GLUCOSE SERPL-MCNC: 151 MG/DL (ref 70–110)
HCT VFR BLD AUTO: 43 % (ref 40–54)
HGB BLD-MCNC: 14.8 G/DL (ref 14–18)
IMM GRANULOCYTES # BLD AUTO: 0.03 K/UL (ref 0–0.04)
IMM GRANULOCYTES NFR BLD AUTO: 0.3 % (ref 0–0.5)
LYMPHOCYTES # BLD AUTO: 0.3 K/UL (ref 1–4.8)
LYMPHOCYTES NFR BLD: 2.4 % (ref 18–48)
MAGNESIUM SERPL-MCNC: 1.6 MG/DL (ref 1.6–2.6)
MCH RBC QN AUTO: 31.2 PG (ref 27–31)
MCHC RBC AUTO-ENTMCNC: 34.4 G/DL (ref 32–36)
MCV RBC AUTO: 91 FL (ref 82–98)
MONOCYTES # BLD AUTO: 0.1 K/UL (ref 0.3–1)
MONOCYTES NFR BLD: 1.1 % (ref 4–15)
NEUTROPHILS # BLD AUTO: 10.7 K/UL (ref 1.8–7.7)
NEUTROPHILS NFR BLD: 96 % (ref 38–73)
NRBC BLD-RTO: 0 /100 WBC
PHOSPHATE SERPL-MCNC: 3.2 MG/DL (ref 2.7–4.5)
PLATELET # BLD AUTO: 125 K/UL (ref 150–450)
PMV BLD AUTO: 10.6 FL (ref 9.2–12.9)
POTASSIUM SERPL-SCNC: 4.1 MMOL/L (ref 3.5–5.1)
RBC # BLD AUTO: 4.74 M/UL (ref 4.6–6.2)
SODIUM SERPL-SCNC: 141 MMOL/L (ref 136–145)
SPECIMEN OUTDATE: NORMAL
WBC # BLD AUTO: 11.12 K/UL (ref 3.9–12.7)

## 2024-05-02 PROCEDURE — 85025 COMPLETE CBC W/AUTO DIFF WBC: CPT | Performed by: UROLOGY

## 2024-05-02 PROCEDURE — 25000003 PHARM REV CODE 250: Performed by: UROLOGY

## 2024-05-02 PROCEDURE — 83735 ASSAY OF MAGNESIUM: CPT | Performed by: UROLOGY

## 2024-05-02 PROCEDURE — 8E0W4CZ ROBOTIC ASSISTED PROCEDURE OF TRUNK REGION, PERCUTANEOUS ENDOSCOPIC APPROACH: ICD-10-PCS | Performed by: UROLOGY

## 2024-05-02 PROCEDURE — C1729 CATH, DRAINAGE: HCPCS | Performed by: UROLOGY

## 2024-05-02 PROCEDURE — D9220A PRA ANESTHESIA: Mod: ANES,,, | Performed by: STUDENT IN AN ORGANIZED HEALTH CARE EDUCATION/TRAINING PROGRAM

## 2024-05-02 PROCEDURE — 63600175 PHARM REV CODE 636 W HCPCS: Performed by: NURSE ANESTHETIST, CERTIFIED REGISTERED

## 2024-05-02 PROCEDURE — 11000001 HC ACUTE MED/SURG PRIVATE ROOM

## 2024-05-02 PROCEDURE — 71000033 HC RECOVERY, INTIAL HOUR: Performed by: UROLOGY

## 2024-05-02 PROCEDURE — 80048 BASIC METABOLIC PNL TOTAL CA: CPT | Performed by: UROLOGY

## 2024-05-02 PROCEDURE — 86901 BLOOD TYPING SEROLOGIC RH(D): CPT | Performed by: UROLOGY

## 2024-05-02 PROCEDURE — 76998 US GUIDE INTRAOP: CPT | Mod: 26,,, | Performed by: UROLOGY

## 2024-05-02 PROCEDURE — 99900035 HC TECH TIME PER 15 MIN (STAT)

## 2024-05-02 PROCEDURE — 71000039 HC RECOVERY, EACH ADD'L HOUR: Performed by: UROLOGY

## 2024-05-02 PROCEDURE — 36000713 HC OR TIME LEV V EA ADD 15 MIN: Performed by: UROLOGY

## 2024-05-02 PROCEDURE — 36000712 HC OR TIME LEV V 1ST 15 MIN: Performed by: UROLOGY

## 2024-05-02 PROCEDURE — 36415 COLL VENOUS BLD VENIPUNCTURE: CPT | Mod: XB | Performed by: UROLOGY

## 2024-05-02 PROCEDURE — 63600175 PHARM REV CODE 636 W HCPCS: Performed by: STUDENT IN AN ORGANIZED HEALTH CARE EDUCATION/TRAINING PROGRAM

## 2024-05-02 PROCEDURE — 84100 ASSAY OF PHOSPHORUS: CPT | Performed by: UROLOGY

## 2024-05-02 PROCEDURE — 63600175 PHARM REV CODE 636 W HCPCS: Performed by: UROLOGY

## 2024-05-02 PROCEDURE — D9220A PRA ANESTHESIA: Mod: CRNA,,, | Performed by: NURSE ANESTHETIST, CERTIFIED REGISTERED

## 2024-05-02 PROCEDURE — 0TB14ZZ EXCISION OF LEFT KIDNEY, PERCUTANEOUS ENDOSCOPIC APPROACH: ICD-10-PCS | Performed by: UROLOGY

## 2024-05-02 PROCEDURE — 63600175 PHARM REV CODE 636 W HCPCS: Mod: JG | Performed by: UROLOGY

## 2024-05-02 PROCEDURE — 50543 LAPARO PARTIAL NEPHRECTOMY: CPT | Mod: LT,,, | Performed by: UROLOGY

## 2024-05-02 PROCEDURE — 94761 N-INVAS EAR/PLS OXIMETRY MLT: CPT

## 2024-05-02 PROCEDURE — 37000009 HC ANESTHESIA EA ADD 15 MINS: Performed by: UROLOGY

## 2024-05-02 PROCEDURE — 94799 UNLISTED PULMONARY SVC/PX: CPT

## 2024-05-02 PROCEDURE — 82570 ASSAY OF URINE CREATININE: CPT | Performed by: UROLOGY

## 2024-05-02 PROCEDURE — 27201423 OPTIME MED/SURG SUP & DEVICES STERILE SUPPLY: Performed by: UROLOGY

## 2024-05-02 PROCEDURE — 25000003 PHARM REV CODE 250: Performed by: NURSE ANESTHETIST, CERTIFIED REGISTERED

## 2024-05-02 PROCEDURE — 37000008 HC ANESTHESIA 1ST 15 MINUTES: Performed by: UROLOGY

## 2024-05-02 RX ORDER — CEFAZOLIN SODIUM 2 G/50ML
2 SOLUTION INTRAVENOUS
Status: DISCONTINUED | OUTPATIENT
Start: 2024-05-02 | End: 2024-05-02

## 2024-05-02 RX ORDER — DOCUSATE SODIUM 100 MG/1
100 CAPSULE, LIQUID FILLED ORAL 2 TIMES DAILY
Status: DISCONTINUED | OUTPATIENT
Start: 2024-05-02 | End: 2024-05-03 | Stop reason: HOSPADM

## 2024-05-02 RX ORDER — INDOCYANINE GREEN AND WATER 25 MG
KIT INJECTION
Status: DISCONTINUED | OUTPATIENT
Start: 2024-05-02 | End: 2024-05-02

## 2024-05-02 RX ORDER — PROCHLORPERAZINE EDISYLATE 5 MG/ML
10 INJECTION INTRAMUSCULAR; INTRAVENOUS EVERY 6 HOURS PRN
Status: DISCONTINUED | OUTPATIENT
Start: 2024-05-02 | End: 2024-05-03 | Stop reason: HOSPADM

## 2024-05-02 RX ORDER — PROPOFOL 10 MG/ML
VIAL (ML) INTRAVENOUS
Status: DISCONTINUED | OUTPATIENT
Start: 2024-05-02 | End: 2024-05-02

## 2024-05-02 RX ORDER — ONDANSETRON HYDROCHLORIDE 2 MG/ML
INJECTION, SOLUTION INTRAVENOUS
Status: DISCONTINUED | OUTPATIENT
Start: 2024-05-02 | End: 2024-05-02

## 2024-05-02 RX ORDER — ACETAMINOPHEN 500 MG
1000 TABLET ORAL EVERY 8 HOURS
Status: DISCONTINUED | OUTPATIENT
Start: 2024-05-02 | End: 2024-05-03 | Stop reason: HOSPADM

## 2024-05-02 RX ORDER — ACETAMINOPHEN 500 MG
1000 TABLET ORAL
Status: COMPLETED | OUTPATIENT
Start: 2024-05-02 | End: 2024-05-02

## 2024-05-02 RX ORDER — LIDOCAINE HYDROCHLORIDE 20 MG/ML
INJECTION INTRAVENOUS
Status: DISCONTINUED | OUTPATIENT
Start: 2024-05-02 | End: 2024-05-02

## 2024-05-02 RX ORDER — HYDROMORPHONE HYDROCHLORIDE 2 MG/ML
1 INJECTION, SOLUTION INTRAMUSCULAR; INTRAVENOUS; SUBCUTANEOUS EVERY 4 HOURS PRN
Status: DISCONTINUED | OUTPATIENT
Start: 2024-05-02 | End: 2024-05-03 | Stop reason: HOSPADM

## 2024-05-02 RX ORDER — FUROSEMIDE 10 MG/ML
INJECTION INTRAMUSCULAR; INTRAVENOUS
Status: DISCONTINUED | OUTPATIENT
Start: 2024-05-02 | End: 2024-05-02

## 2024-05-02 RX ORDER — CEFAZOLIN SODIUM 1 G/3ML
INJECTION, POWDER, FOR SOLUTION INTRAMUSCULAR; INTRAVENOUS
Status: DISCONTINUED | OUTPATIENT
Start: 2024-05-02 | End: 2024-05-02

## 2024-05-02 RX ORDER — ROCURONIUM BROMIDE 10 MG/ML
INJECTION, SOLUTION INTRAVENOUS
Status: DISCONTINUED | OUTPATIENT
Start: 2024-05-02 | End: 2024-05-02

## 2024-05-02 RX ORDER — OXYCODONE HYDROCHLORIDE 5 MG/1
5 TABLET ORAL EVERY 4 HOURS PRN
Status: DISCONTINUED | OUTPATIENT
Start: 2024-05-02 | End: 2024-05-03 | Stop reason: HOSPADM

## 2024-05-02 RX ORDER — FENTANYL CITRATE 50 UG/ML
INJECTION, SOLUTION INTRAMUSCULAR; INTRAVENOUS
Status: DISCONTINUED | OUTPATIENT
Start: 2024-05-02 | End: 2024-05-02

## 2024-05-02 RX ORDER — SODIUM CHLORIDE, SODIUM LACTATE, POTASSIUM CHLORIDE, CALCIUM CHLORIDE 600; 310; 30; 20 MG/100ML; MG/100ML; MG/100ML; MG/100ML
INJECTION, SOLUTION INTRAVENOUS CONTINUOUS
Status: DISCONTINUED | OUTPATIENT
Start: 2024-05-02 | End: 2024-05-03 | Stop reason: HOSPADM

## 2024-05-02 RX ORDER — OXYCODONE HYDROCHLORIDE 5 MG/1
5 TABLET ORAL
Status: DISCONTINUED | OUTPATIENT
Start: 2024-05-02 | End: 2024-05-02 | Stop reason: HOSPADM

## 2024-05-02 RX ORDER — HYDROMORPHONE HYDROCHLORIDE 2 MG/ML
0.2 INJECTION, SOLUTION INTRAMUSCULAR; INTRAVENOUS; SUBCUTANEOUS EVERY 5 MIN PRN
Status: DISCONTINUED | OUTPATIENT
Start: 2024-05-02 | End: 2024-05-02 | Stop reason: HOSPADM

## 2024-05-02 RX ORDER — LIDOCAINE HYDROCHLORIDE 10 MG/ML
1 INJECTION, SOLUTION EPIDURAL; INFILTRATION; INTRACAUDAL; PERINEURAL ONCE
Status: DISCONTINUED | OUTPATIENT
Start: 2024-05-02 | End: 2024-05-02

## 2024-05-02 RX ORDER — PROCHLORPERAZINE EDISYLATE 5 MG/ML
5 INJECTION INTRAMUSCULAR; INTRAVENOUS EVERY 30 MIN PRN
Status: DISCONTINUED | OUTPATIENT
Start: 2024-05-02 | End: 2024-05-02 | Stop reason: HOSPADM

## 2024-05-02 RX ORDER — HYDRALAZINE HYDROCHLORIDE 20 MG/ML
10 INJECTION INTRAMUSCULAR; INTRAVENOUS EVERY 6 HOURS PRN
Status: DISCONTINUED | OUTPATIENT
Start: 2024-05-02 | End: 2024-05-03 | Stop reason: HOSPADM

## 2024-05-02 RX ORDER — SCOLOPAMINE TRANSDERMAL SYSTEM 1 MG/1
1 PATCH, EXTENDED RELEASE TRANSDERMAL ONCE
Status: DISCONTINUED | OUTPATIENT
Start: 2024-05-02 | End: 2024-05-03 | Stop reason: HOSPADM

## 2024-05-02 RX ORDER — DOCUSATE SODIUM 100 MG/1
100 CAPSULE, LIQUID FILLED ORAL 2 TIMES DAILY
Qty: 14 CAPSULE | Refills: 0 | Status: SHIPPED | OUTPATIENT
Start: 2024-05-02 | End: 2024-05-09

## 2024-05-02 RX ORDER — OXYCODONE AND ACETAMINOPHEN 5; 325 MG/1; MG/1
1 TABLET ORAL EVERY 4 HOURS PRN
Qty: 30 TABLET | Refills: 0 | Status: SHIPPED | OUTPATIENT
Start: 2024-05-02 | End: 2024-05-07

## 2024-05-02 RX ORDER — ONDANSETRON HYDROCHLORIDE 2 MG/ML
4 INJECTION, SOLUTION INTRAVENOUS EVERY 8 HOURS PRN
Status: DISCONTINUED | OUTPATIENT
Start: 2024-05-02 | End: 2024-05-03 | Stop reason: HOSPADM

## 2024-05-02 RX ORDER — DEXAMETHASONE SODIUM PHOSPHATE 4 MG/ML
INJECTION, SOLUTION INTRA-ARTICULAR; INTRALESIONAL; INTRAMUSCULAR; INTRAVENOUS; SOFT TISSUE
Status: DISCONTINUED | OUTPATIENT
Start: 2024-05-02 | End: 2024-05-02

## 2024-05-02 RX ORDER — OXYCODONE HYDROCHLORIDE 5 MG/1
10 TABLET ORAL EVERY 4 HOURS PRN
Status: DISCONTINUED | OUTPATIENT
Start: 2024-05-02 | End: 2024-05-03 | Stop reason: HOSPADM

## 2024-05-02 RX ORDER — ACETAMINOPHEN 10 MG/ML
1000 INJECTION, SOLUTION INTRAVENOUS ONCE
Status: ACTIVE | OUTPATIENT
Start: 2024-05-02 | End: 2024-05-03

## 2024-05-02 RX ORDER — MIDAZOLAM HYDROCHLORIDE 1 MG/ML
INJECTION INTRAMUSCULAR; INTRAVENOUS
Status: DISCONTINUED | OUTPATIENT
Start: 2024-05-02 | End: 2024-05-02

## 2024-05-02 RX ORDER — BUPIVACAINE HYDROCHLORIDE 2.5 MG/ML
INJECTION, SOLUTION INFILTRATION; PERINEURAL
Status: DISCONTINUED | OUTPATIENT
Start: 2024-05-02 | End: 2024-05-02 | Stop reason: HOSPADM

## 2024-05-02 RX ORDER — METOPROLOL TARTRATE 1 MG/ML
5 INJECTION, SOLUTION INTRAVENOUS EVERY 6 HOURS PRN
Status: DISCONTINUED | OUTPATIENT
Start: 2024-05-02 | End: 2024-05-03 | Stop reason: HOSPADM

## 2024-05-02 RX ADMIN — OXYCODONE 5 MG: 5 TABLET ORAL at 09:05

## 2024-05-02 RX ADMIN — SODIUM CHLORIDE: 0.9 INJECTION, SOLUTION INTRAVENOUS at 07:05

## 2024-05-02 RX ADMIN — INDOCYANINE GREEN 1.25 MG: KIT INTRAVENOUS at 10:05

## 2024-05-02 RX ADMIN — HYDROMORPHONE HYDROCHLORIDE 0.2 MG: 2 INJECTION, SOLUTION INTRAMUSCULAR; INTRAVENOUS; SUBCUTANEOUS at 12:05

## 2024-05-02 RX ADMIN — ONDANSETRON 4 MG: 2 INJECTION, SOLUTION INTRAMUSCULAR; INTRAVENOUS at 10:05

## 2024-05-02 RX ADMIN — MIDAZOLAM HYDROCHLORIDE 2 MG: 1 INJECTION, SOLUTION INTRAMUSCULAR; INTRAVENOUS at 06:05

## 2024-05-02 RX ADMIN — SCOPALAMINE 1 PATCH: 1 PATCH, EXTENDED RELEASE TRANSDERMAL at 06:05

## 2024-05-02 RX ADMIN — FENTANYL CITRATE 50 MCG: 50 INJECTION INTRAMUSCULAR; INTRAVENOUS at 07:05

## 2024-05-02 RX ADMIN — PROPOFOL 150 MG: 10 INJECTION, EMULSION INTRAVENOUS at 07:05

## 2024-05-02 RX ADMIN — CEFAZOLIN 2 G: 330 INJECTION, POWDER, FOR SOLUTION INTRAMUSCULAR; INTRAVENOUS at 07:05

## 2024-05-02 RX ADMIN — SODIUM CHLORIDE, POTASSIUM CHLORIDE, SODIUM LACTATE AND CALCIUM CHLORIDE: 600; 310; 30; 20 INJECTION, SOLUTION INTRAVENOUS at 03:05

## 2024-05-02 RX ADMIN — FENTANYL CITRATE 50 MCG: 50 INJECTION INTRAMUSCULAR; INTRAVENOUS at 08:05

## 2024-05-02 RX ADMIN — ACETAMINOPHEN 1000 MG: 500 TABLET ORAL at 09:05

## 2024-05-02 RX ADMIN — DOCUSATE SODIUM 100 MG: 100 CAPSULE, LIQUID FILLED ORAL at 09:05

## 2024-05-02 RX ADMIN — ROCURONIUM BROMIDE 10 MG: 10 INJECTION, SOLUTION INTRAVENOUS at 09:05

## 2024-05-02 RX ADMIN — FUROSEMIDE 10 MG: 10 INJECTION, SOLUTION INTRAMUSCULAR; INTRAVENOUS at 10:05

## 2024-05-02 RX ADMIN — SODIUM CHLORIDE, SODIUM LACTATE, POTASSIUM CHLORIDE, AND CALCIUM CHLORIDE: .6; .31; .03; .02 INJECTION, SOLUTION INTRAVENOUS at 08:05

## 2024-05-02 RX ADMIN — LIDOCAINE HYDROCHLORIDE 100 MG: 20 INJECTION, SOLUTION INTRAVENOUS at 07:05

## 2024-05-02 RX ADMIN — SUGAMMADEX 200 MG: 100 INJECTION, SOLUTION INTRAVENOUS at 10:05

## 2024-05-02 RX ADMIN — FENTANYL CITRATE 50 MCG: 50 INJECTION INTRAMUSCULAR; INTRAVENOUS at 10:05

## 2024-05-02 RX ADMIN — CEFAZOLIN 2 G: 2 INJECTION, POWDER, FOR SOLUTION INTRAMUSCULAR; INTRAVENOUS at 11:05

## 2024-05-02 RX ADMIN — ACETAMINOPHEN 1000 MG: 500 TABLET ORAL at 05:05

## 2024-05-02 RX ADMIN — ONDANSETRON 4 MG: 2 INJECTION INTRAMUSCULAR; INTRAVENOUS at 08:05

## 2024-05-02 RX ADMIN — INDOCYANINE GREEN 1.25 MG: KIT INTRAVENOUS at 09:05

## 2024-05-02 RX ADMIN — FENTANYL CITRATE 100 MCG: 50 INJECTION INTRAMUSCULAR; INTRAVENOUS at 07:05

## 2024-05-02 RX ADMIN — ROCURONIUM BROMIDE 20 MG: 10 INJECTION, SOLUTION INTRAVENOUS at 10:05

## 2024-05-02 RX ADMIN — ONDANSETRON 4 MG: 2 INJECTION, SOLUTION INTRAMUSCULAR; INTRAVENOUS at 07:05

## 2024-05-02 RX ADMIN — SODIUM CHLORIDE, SODIUM LACTATE, POTASSIUM CHLORIDE, AND CALCIUM CHLORIDE: .6; .31; .03; .02 INJECTION, SOLUTION INTRAVENOUS at 06:05

## 2024-05-02 RX ADMIN — FENTANYL CITRATE 50 MCG: 50 INJECTION INTRAMUSCULAR; INTRAVENOUS at 11:05

## 2024-05-02 RX ADMIN — ROCURONIUM BROMIDE 50 MG: 10 INJECTION, SOLUTION INTRAVENOUS at 07:05

## 2024-05-02 RX ADMIN — CEFAZOLIN 2 G: 2 INJECTION, POWDER, FOR SOLUTION INTRAMUSCULAR; INTRAVENOUS at 03:05

## 2024-05-02 RX ADMIN — DEXAMETHASONE SODIUM PHOSPHATE 8 MG: 4 INJECTION, SOLUTION INTRA-ARTICULAR; INTRALESIONAL; INTRAMUSCULAR; INTRAVENOUS; SOFT TISSUE at 07:05

## 2024-05-02 NOTE — H&P
Subjective:       Patient ID: Lopez Quigley is a 63 y.o. male.    Chief Complaint: Left renal mass     This is a 63 y.o.  male patient with left renal mass, kidney stones.  CT without contrast 3/12/24 with left 2 mm UVJ stone with mild hydro, left 2.2 cm renal lesion.  Right-sided small nonobstructing stones.  Intermittent left-sided flank discomfort.  No fever or chills.  Labs 03/18/24 creatinine okay at 1.2, white blood cell count normal.  PSA was 4.1.  CT w/wo contrast 3/26/24 with left 2 cm renal mass, no left ureteral stone or hydronephrosis, right non obstructing small stones.      Here for left partial nephrectomy    LAST PSA  Lab Results   Component Value Date    PSA 4.1 (H) 03/18/2024       Lab Results   Component Value Date    CREATININE 1.3 05/01/2024       ---  PMH/PSH/Medications/Allergies/Social history reviewed and as in chart.    Review of Systems   Constitutional:  Negative for activity change, chills and fever.   HENT:  Negative for congestion.    Respiratory:  Negative for cough, chest tightness and shortness of breath.    Cardiovascular:  Negative for chest pain and palpitations.   Gastrointestinal:  Negative for abdominal distention, abdominal pain, nausea and vomiting.   Genitourinary:  Positive for flank pain. Negative for difficulty urinating, hematuria, penile pain, scrotal swelling and testicular pain.   Musculoskeletal:  Negative for gait problem.       Objective:      Physical Exam  Constitutional:       Appearance: Normal appearance.   HENT:      Head: Normocephalic.   Pulmonary:      Effort: Pulmonary effort is normal.      Breath sounds: Normal breath sounds.   Abdominal:      General: Abdomen is flat. Bowel sounds are normal.      Palpations: Abdomen is soft.      Tenderness: There is no abdominal tenderness. There is no right CVA tenderness, left CVA tenderness or guarding.   Musculoskeletal:         General: Normal range of motion.      Cervical back: Normal range of motion.    Skin:     General: Skin is warm.   Neurological:      Mental Status: He is alert.           Results for orders placed or performed during the hospital encounter of 03/26/24 (from the past 2160 hour(s))   CT Abdomen Pelvis W Wo Contrast    Narrative    EXAM:  CT ABDOMEN PELVIS W WO CONTRAST    CLINICAL HISTORY:          left renal lesion, left ureteral stone; [N20.1]-Calculus of ureter./[N28.9]-Disorder of kidney and ureter, unspecified.    TECHNIQUE: Helical CT images through the abdomen and pelvis were obtained before and after intravenous administration of  iodinated contrast. All CT scans at [this location] are performed using dose modulation techniques as appropriate to a performed exam including the following: automated exposure control; adjustment of the mA and/or kV according to patient size (this includes techniques or standardized protocols for targeted exams where dose is matched to indication / reason for exam; i.e. extremities or head); use of iterative reconstruction technique.    COMPARISON: None.    FINDINGS:    Lower chest: No significant abnormality.  Liver: Satisfactory appearance of the liver.  Gallbladder: Unremarkable gallbladder.  Biliary: No significant biliary ductal dilatation.  Pancreas: No significant abnormality of the pancreas, adrenal glands, and spleen.  Adrenal glands: Unremarkable.  Spleen: Unremarkable.  Kidneys/ureters: Cystic and solid exophytic 2.5 x 2.2 cm left renal mass anterior lower pole.  There is a too small to characterize hypodensity in the right kidney statistically representing a cyst.  No hydronephrosis on either side.  There are 2 nonobstructing right renal calculi, the largest measures 2 mm.  No renal calculus on the left.  No hydronephrosis on either side.  No urothelial lesion in the renal pelves or proximal ureters.  The distal ureters and bladder are not included on the delayed imaging.  Bladder: Grossly unremarkable.  Reproductive: Prostate is enlarged, 5.1  cm.  Bilateral scrotal hydroceles.  Probable vasectomy clips.  Gastrointestinal: No bowel obstruction or inflammation. No intraperitoneal free air or significant free fluid.  The appendix has a normal appearance.  Retroperitoneum: No adenopathy.  Vascular: Nonaneurysmal aorta.  No acute vascular abnormality.  Osseous: No acute fracture or aggressive appearing osseous lesion. Degenerative disc disease most pronounced at the L3-L4 and L4-L5 levels.        Impression    1.    Enhancing cystic and solid left renal mass measures 2.5 x 2.2 cm.  Finding is consistent with malignancy.  No evidence of distal metastasis in the abdomen or pelvis.  2.    Small bilateral scrotal hydroceles.  3.    Other findings as above.    Finalized on: 3/26/2024 11:28 AM By:  Carmelo Hernandez MD  [facility]# [id number]      2024-03-26 11:30:[id number]    [facility]   Results for orders placed or performed during the hospital encounter of 03/12/24 (from the past 2160 hour(s))   CT Renal Stone Study ABD Pelvis WO    Narrative    EXAMINATION:  CT RENAL STONE STUDY ABD PELVIS WO    CLINICAL HISTORY:  Flank pain, kidney stone suspected;    TECHNIQUE:  Low dose axial images, sagittal and coronal reformations were obtained from the lung bases to the pubic symphysis, Oral contrast was not administered.    COMPARISON:  None    FINDINGS:  Lower chest: Gynecomastia.    Heart: Normal in size. No pericardial effusion.  Moderate coronary artery calcification.    Lung Bases: Well aerated, without consolidation or pleural fluid.    Liver: Normal in size and attenuation, with no focal hepatic lesions.    Gallbladder: No calcified gallstones.    Bile Ducts: No evidence of dilated ducts.    Pancreas: No mass or peripancreatic fat stranding.    Spleen: Unremarkable.    Adrenals: Unremarkable.    Kidneys/ Ureters: 2 mm obstructive stone at the left UVJ with resultant mild left-sided hydroureteronephrosis.  Heterogeneous 2.2 x 1.7 cm rounded lesion at the inferior pole left kidney.   Nonobstructive right nephrolithiasis.  Mild bilateral infectious or inflammatory left perinephric fat stranding, left greater than right.    Bladder: No evidence of wall thickening.    Reproductive organs: Enlarged prostate gland abuts the base  of the bladder.    GI Tract/Mesentery: Colonic diverticulosis, without diverticulitis.  No small bowel obstruction.    Peritoneal Space: No ascites. No free air.    Retroperitoneum: No significant adenopathy.    Abdominal wall: Small fat containing umbilical hernia.  Small bilateral fat containing inguinal hernias.    Vasculature: No significant atherosclerosis or aneurysm.    Bones: No acute fracture.      Impression    2 mm obstructive stone at the left UVJ with resultant mild left-sided hydroureteronephrosis.    Heterogeneous 2.2 x 1.7 cm rounded lesion at the inferior pole left kidney.  Recommend CT or MR renal mass protocol to exclude solid neoplasm.    Nonobstructive right nephrolithiasis.    Mild bilateral infectious or inflammatory left perinephric fat stranding, left greater than right.      Electronically signed by: Lisa Rodriguez  Date:    03/12/2024  Time:    21:50       Assessment:     Left renal mass      Plan:     OR for left robotic assisted lap partial nephrectomy  The risks, benefits, alteratives of the procedure were discussed with the patient.  The patient was given time for questions, all questions were answered.  Written, informed consent was obtained.          I have explained the risk, benefits, and alternatives of the procedure in detail.  The risks including but not limited to bleeding, pseudoaneurysm, AVM, injury to adjacent structures including the spleen, liver, lung, pancreas, colon and intestines, blood vessels in the abdomen including the renal artery or renal vein, ureter, loss of kidney, urinoma or urinary fistula, or need for conversion to open or radical nephrectomy were explained to the patient in depth. The patient voices  understanding and all questions have been answered. The patient agrees to proceed as planned with a left robotic partial nephrectomy.        Mick Starr MD

## 2024-05-02 NOTE — ANESTHESIA POSTPROCEDURE EVALUATION
Anesthesia Post Evaluation    Patient: Lopez Quigley    Procedure(s) Performed: Procedure(s) (LRB):  ROBOTIC NEPHRECTOMY, PARTIAL (Left)    Final Anesthesia Type: general      Patient location during evaluation: PACU  Patient participation: Yes- Able to Participate  Level of consciousness: awake and alert  Post-procedure vital signs: reviewed and stable  Pain management: adequate  Airway patency: patent  JESSICA mitigation strategies: Multimodal analgesia  PONV status at discharge: No PONV  Anesthetic complications: no      Cardiovascular status: hemodynamically stable  Respiratory status: spontaneous ventilation and room air  Hydration status: euvolemic  Follow-up not needed.              Vitals Value Taken Time   /63 05/02/24 1345   Temp  05/02/24 1347   Pulse 82 05/02/24 1346   Resp 15 05/02/24 1346   SpO2 100 % 05/02/24 1346   Vitals shown include unfiled device data.      No case tracking events are documented in the log.      Pain/Gino Score: Pain Rating Prior to Med Admin: 6 (5/2/2024 12:35 PM)  Gino Score: 8 (5/2/2024  1:30 PM)

## 2024-05-02 NOTE — NURSING
Report received from Wendy in PACU. Patient received in stable condition in no acute distress. Lap sites x4 and LLQ MYRON drain in place, CDI. Bed weight and VS taken. IV Fluids infusing. Will continue to monitor.

## 2024-05-02 NOTE — ANESTHESIA PROCEDURE NOTES
Intubation    Date/Time: 5/2/2024 7:13 AM    Performed by: Sanjana Tejada CRNA  Authorized by: Fab Franco MD    Intubation:     Induction:  Intravenous    Intubated:  Postinduction    Mask Ventilation:  Easy mask    Attempts:  1    Attempted By:  CRNA    Method of Intubation:  Video laryngoscopy    Blade:  Ibrahim 3    Laryngeal View Grade: Grade I - full view of cords      Difficult Airway Encountered?: No      Complications:  None    Airway Device:  Oral endotracheal tube    Airway Device Size:  7.5    Style/Cuff Inflation:  Cuffed (inflated to minimal occlusive pressure)    Inflation Amount (mL):  6    Tube secured:  21    Secured at:  The lips    Placement Verified By:  Capnometry    Complicating Factors:  None    Findings Post-Intubation:  BS equal bilateral and atraumatic/condition of teeth unchanged

## 2024-05-02 NOTE — OP NOTE
OPERATIVE NOTE    DATE OF SURGERY:  5/2/2024    PREOPERATIVE DIAGNOSIS:  Left renal mass.    POSTOPERATIVE DIAGNOSIS:  Left renal mass.    OPERATION PERFORMED:  1.  Robotic assisted laparoscopic left partial nephrectomy.  2.  Intraoperative ultrasound with interpretation.    SURGEON:  David Wong MD    RESIDENT: Dr. Starr, PGY 3    ASSISTANT:  Stew Easton    FINDINGS:  Hilar anatomy: 1 arteries.  1 veins.  2 cm renal mass.  Warm ischemia time: 18 minute.  Collecting system entry noted, gross negative margin.          SPECIMENS:  1. Renal mass    .* No specimens in log *    * No implants in log *    BLOOD LOSS:  150    ANESTHESIA:  General.    COMPLICATIONS:  None.    DRAINS:  16-[place] Suresh catheter.  15 Round MYRON    INDICATIONS FOR OPERATION:  Lopez Quigley is a 63 y.o. M with a left renal mass, 2 cm, suspicious for malignancy.  Options discussed, wished to proceed with robotic partial nephrectomy.  Patient understands risks and wishes to proceed.  Written informed consent was obtained prior to the procedure.  All questions were answered.      DESCRIPTION OF OPERATION:  Informed consent was obtained.  The patient was marked on the left side and then taken to the operating room, placed supine on the operating table.  General anesthesia was provided. SCDs were provided for DVT prophylaxis and IV antibiotics for bacterial prophylaxis.  Suresh catheter was placed to drain the bladder.  OG tube was placed by anesthesia.  The patient was then positioned in right lateral decubitus position with the left flank elevated about 70 degrees.  All pressure points were carefully padded.  The table was flexed slightly.  The left arm was placed in a padded support.  The patient was secured to the table with soft chest, hip, and lower extremity straps.  The patient was prepped and draped in usual sterile fashion.  We had a time-out confirming the patient identification, the planned procedure, the surgical site, and all  present were in agreement.      A Veress needle was placed just lateral to the left rectus belly at the level of the umbilicus. Aspiration, irrigation, and saline drop test confirmed good position.  Opening pressure was less than 9 mmHg. The abdomen was insufflated to 15 mmHg.  Following this, trocar sites were mapped out in a straight line at the mid-axillary line, four 8mm trocars were marked  A 12 mm midline trocar for the assistant superior to the umbilicus was marked.  All trocar sites were infiltrated with liposomal marcaine at the end of the case.       The first port was placed lateral to the umbilicus and the addition ports were placed under visual guidance, and the peritoneal cavity was surveyed with no abnormalities or injuries.  Remaining trocars were placed under laparoscopic visualization.  I used a [employer] device to place 0 Vicryl sutures at the 12 mm trocar site for closure at the end of the case.  The robot was docked.  I placed monopolar veronica in the right arm, PK in the left, and a long articulating grasper in the 4th arm.      The white line of Toldt was incised and the left colon was reflected.  The tail of Gerota's fascia was lifted up and the ureter and gonadal vein were identified.  The gonadal vein was left medial and the ureter was lifted up.  The psoas muscle was identified under the ureter and I followed this plane towards the renal hilum.  The hilar vessels were identified and exposed circumferentially.    At this point, I incised Gerota's fascia and defatted the kidney in the region of the tumor.  Once the tumor was exposed, intraoperative ultrasound was performed.  The mass was imaged and measured, and then excision markings were made using ultrasound. Two bulldog clamps were placed on the renal artery.  Vein was left unclamped.  Warm ischemia time was clocked.  The renal mass was sharply excised.  Margin was grossly clear.  There was entry into the collecting system.  The  deep layer of the renorrhaphy was closed with running V-Loc suture, anchored at each end with a weck and Lapra-Ty.  An additional V-Loc was used to run a second layer and close the collecting system.  The bulldog clamp was removed.  Warm ischemia time was 18 minutes.    A sliding clip renorrhaphy was performed to close the outer layer, using interrupted 2-0 vicryl suture, Weck clips, and Lapra-Ty's.  The capsule edges were nicely approximated.  Tisseel was applied, followed by Floseal and a piece of surgicel.  All needles, bulldogs, and vessel loops were extracted.  The specimen was placed in an Endocatch bag.      I reduced the pneumoperitoneal pressure to 7 mmHg and confirmed hemostasis throughout the surgical field.  The kidney was well perfused and the renorrhaphy was hemostatic.  Gerota's fascia was closed with running 3-0 V-loc suture.      A 15 round MYRON drain was placed lateral to the kidney.    Trocars were removed under direct vision.  The tumor was extracted and sent for pathology.  The extraction site was closed with running #1 PDS sutures.  All the wounds were copiously irrigated.  The skin edges were re approximated with 4-0 Monocryl.  Dermabond was applied.      All sponge, needle, and instrument counts were reported correct.  The patient was awakened from anesthesia and transferred to recovery in stable condition.  There were no complications and the patient tolerated the procedure well.  Operative events were discussed with the patient's family.      Dispo: admit for recovery. MYRON Cr at 1800.        David Wong MD

## 2024-05-02 NOTE — TRANSFER OF CARE
"Anesthesia Transfer of Care Note    Patient: Lopez Quigley    Procedure(s) Performed: Procedure(s) (LRB):  ROBOTIC NEPHRECTOMY, PARTIAL (Left)    Patient location: PACU    Anesthesia Type: general    Transport from OR: Transported from OR on 6-10 L/min O2 by face mask with adequate spontaneous ventilation    Post pain: adequate analgesia    Post assessment: no apparent anesthetic complications    Post vital signs: stable    Level of consciousness: awake, alert and oriented    Nausea/Vomiting: no nausea/vomiting    Complications: none    Transfer of care protocol was followed      Last vitals: Visit Vitals  /69 (BP Location: Right arm, Patient Position: Lying)   Pulse 81   Temp 37.2 °C (98.9 °F) (Tympanic)   Resp 16   Ht 5' 7" (1.702 m)   Wt 77.1 kg (170 lb)   SpO2 96%   BMI 26.63 kg/m²     "

## 2024-05-03 VITALS
HEART RATE: 65 BPM | WEIGHT: 174.19 LBS | TEMPERATURE: 98 F | RESPIRATION RATE: 18 BRPM | HEIGHT: 67 IN | DIASTOLIC BLOOD PRESSURE: 66 MMHG | OXYGEN SATURATION: 96 % | SYSTOLIC BLOOD PRESSURE: 116 MMHG | BODY MASS INDEX: 27.34 KG/M2

## 2024-05-03 DIAGNOSIS — N20.1 LEFT URETERAL STONE: Primary | ICD-10-CM

## 2024-05-03 LAB
ANION GAP SERPL CALC-SCNC: 11 MMOL/L (ref 8–16)
BASOPHILS # BLD AUTO: 0.01 K/UL (ref 0–0.2)
BASOPHILS NFR BLD: 0.1 % (ref 0–1.9)
BODY FLUID SOURCE, CREATININE: NORMAL
BUN SERPL-MCNC: 17 MG/DL (ref 8–23)
CALCIUM SERPL-MCNC: 8.6 MG/DL (ref 8.7–10.5)
CHLORIDE SERPL-SCNC: 105 MMOL/L (ref 95–110)
CO2 SERPL-SCNC: 23 MMOL/L (ref 23–29)
CREAT FLD-MCNC: 1.2 MG/DL
CREAT SERPL-MCNC: 1.6 MG/DL (ref 0.5–1.4)
DIFFERENTIAL METHOD BLD: ABNORMAL
EOSINOPHIL # BLD AUTO: 0 K/UL (ref 0–0.5)
EOSINOPHIL NFR BLD: 0 % (ref 0–8)
ERYTHROCYTE [DISTWIDTH] IN BLOOD BY AUTOMATED COUNT: 11.5 % (ref 11.5–14.5)
EST. GFR  (NO RACE VARIABLE): 49 ML/MIN/1.73 M^2
GLUCOSE SERPL-MCNC: 149 MG/DL (ref 70–110)
HCT VFR BLD AUTO: 40.5 % (ref 40–54)
HGB BLD-MCNC: 13.9 G/DL (ref 14–18)
IMM GRANULOCYTES # BLD AUTO: 0.06 K/UL (ref 0–0.04)
IMM GRANULOCYTES NFR BLD AUTO: 0.5 % (ref 0–0.5)
LYMPHOCYTES # BLD AUTO: 0.5 K/UL (ref 1–4.8)
LYMPHOCYTES NFR BLD: 3.7 % (ref 18–48)
MAGNESIUM SERPL-MCNC: 1.7 MG/DL (ref 1.6–2.6)
MCH RBC QN AUTO: 30.9 PG (ref 27–31)
MCHC RBC AUTO-ENTMCNC: 34.3 G/DL (ref 32–36)
MCV RBC AUTO: 90 FL (ref 82–98)
MONOCYTES # BLD AUTO: 0.9 K/UL (ref 0.3–1)
MONOCYTES NFR BLD: 7.4 % (ref 4–15)
NEUTROPHILS # BLD AUTO: 11.1 K/UL (ref 1.8–7.7)
NEUTROPHILS NFR BLD: 88.3 % (ref 38–73)
NRBC BLD-RTO: 0 /100 WBC
PHOSPHATE SERPL-MCNC: 2.8 MG/DL (ref 2.7–4.5)
PLATELET # BLD AUTO: 136 K/UL (ref 150–450)
PMV BLD AUTO: 11.3 FL (ref 9.2–12.9)
POTASSIUM SERPL-SCNC: 4.4 MMOL/L (ref 3.5–5.1)
RBC # BLD AUTO: 4.5 M/UL (ref 4.6–6.2)
SODIUM SERPL-SCNC: 139 MMOL/L (ref 136–145)
WBC # BLD AUTO: 12.59 K/UL (ref 3.9–12.7)

## 2024-05-03 PROCEDURE — 80048 BASIC METABOLIC PNL TOTAL CA: CPT | Performed by: UROLOGY

## 2024-05-03 PROCEDURE — 83735 ASSAY OF MAGNESIUM: CPT | Performed by: UROLOGY

## 2024-05-03 PROCEDURE — 25000003 PHARM REV CODE 250: Performed by: UROLOGY

## 2024-05-03 PROCEDURE — 84100 ASSAY OF PHOSPHORUS: CPT | Performed by: UROLOGY

## 2024-05-03 PROCEDURE — 51798 US URINE CAPACITY MEASURE: CPT

## 2024-05-03 PROCEDURE — 63600175 PHARM REV CODE 636 W HCPCS: Performed by: UROLOGY

## 2024-05-03 PROCEDURE — 36415 COLL VENOUS BLD VENIPUNCTURE: CPT | Performed by: UROLOGY

## 2024-05-03 PROCEDURE — 94761 N-INVAS EAR/PLS OXIMETRY MLT: CPT

## 2024-05-03 PROCEDURE — 94799 UNLISTED PULMONARY SVC/PX: CPT

## 2024-05-03 PROCEDURE — 99900035 HC TECH TIME PER 15 MIN (STAT)

## 2024-05-03 PROCEDURE — 85025 COMPLETE CBC W/AUTO DIFF WBC: CPT | Performed by: UROLOGY

## 2024-05-03 RX ORDER — ONDANSETRON 4 MG/1
8 TABLET, ORALLY DISINTEGRATING ORAL EVERY 8 HOURS PRN
Qty: 21 TABLET | Refills: 0 | Status: SHIPPED | OUTPATIENT
Start: 2024-05-03 | End: 2024-05-10

## 2024-05-03 RX ADMIN — DOCUSATE SODIUM 100 MG: 100 CAPSULE, LIQUID FILLED ORAL at 08:05

## 2024-05-03 RX ADMIN — SODIUM CHLORIDE, POTASSIUM CHLORIDE, SODIUM LACTATE AND CALCIUM CHLORIDE: 600; 310; 30; 20 INJECTION, SOLUTION INTRAVENOUS at 12:05

## 2024-05-03 RX ADMIN — CEFAZOLIN 2 G: 2 INJECTION, POWDER, FOR SOLUTION INTRAMUSCULAR; INTRAVENOUS at 08:05

## 2024-05-03 RX ADMIN — ACETAMINOPHEN 1000 MG: 500 TABLET ORAL at 05:05

## 2024-05-03 RX ADMIN — OXYCODONE 5 MG: 5 TABLET ORAL at 03:05

## 2024-05-03 RX ADMIN — OXYCODONE 5 MG: 5 TABLET ORAL at 12:05

## 2024-05-03 NOTE — NURSING
Voiding trial completed as ordered.  urinated twice, post void Bladder scan was less than 300 each time. Clear yellow urine was voided out by patient spontaneously. MD notified. Awaiting NP to remove MYRON drain before Discharge.

## 2024-05-03 NOTE — PLAN OF CARE
Discharge orders noted. Additional clinical references attached. Patient's discharge instructions given by bedside RN and reviewed by this VN with patient. Education provided on home care instructions, new and previous medications, diagnosis, when to seek medical attention, and follow-up appointments. New medications delivered by pharmacy. Patient verbalized understanding. Patient's family to provide ride/transportation home. All questions answered. Transport to [facility] requested. Floor nurse notified.

## 2024-05-03 NOTE — PROGRESS NOTES
Urology      POD1    S:  CARLOS, no flatus pain ok, ambulated. No N/V.     O: good uop drain 50 overnight ss    NAD   Abd S/ND/aTTP, inc cdi     Labs ok  MYRON Cr negative     A: s/p left partial nephrectomy    P:    Advance diet, slow IVF, dc pak, pvr x 2  Will remove MYRON at lunch  Likely dc this PM  Fu 5/14/24 with BMP prior     MIRIAM Wong MD

## 2024-05-03 NOTE — PLAN OF CARE
The sw met with the pt and Nessa Quigley([phone] who was at bedside to complete the assessment. The pt's independent with his ADL's and doesn't use dme. The pt's retired and lives in [place] with Nessa Quigley([phone]. The pt still drives but his wife will transport him home at d/c. The pt has a very supportive family. The sw completed the white board in the pt's room with her name and contact info. The sw gave the pt's wife a d/c brochure with her contact info on it. The sw encouraged them to call if they have any further questions or concerns. The sw will continue to follow the pt throughout his transition s of care and will assist with any d/c needs.     [facility] - Med Surg  Initial Discharge Assessment       Primary Care Provider: Pankaj Winters MD    Admission Diagnosis: Left renal mass [N28.89]    Admission Date: 5/2/2024  Expected Discharge Date: 5/3/2024    Transition of Care Barriers: (P) None    Payor: AETNA / Plan: AETNA OPEN CHOICE / Product Type: PPO /     Extended Emergency Contact Information  Primary Emergency Contact: Nessa Quigley  Address: [address] [place] [address] [place] [address] [facility]  Home Phone: [phone]  Relation: Spouse    Discharge Plan A: (P) Home with family  Discharge Plan B: (P) Home Health      [id number] - [place] - [address] [facility]  [address] [place] [address]  Phone: [phone] Fax: [phone]      Initial Assessment (most recent)       Adult Discharge Assessment - 05/03/24 1006          Discharge Assessment    Assessment Type Discharge Planning Assessment (P)      Confirmed/corrected address, phone number and insurance Yes (P)      Confirmed Demographics Correct on Facesheet (P)      Source of Information patient;family (P)      When was your last doctors appointment? -- (P)    about 1 month ago    Communicated NATE with patient/caregiver Yes (P)      Reason For Admission Left renal mass (P)      People in Home  spouse (P)      Do you expect to return to your current living situation? Yes (P)      Do you have help at home or someone to help you manage your care at home? Yes (P)      Who are your caregiver(s) and their phone number(s)? Nessa Quigley(wife)[phone] (P)      Prior to hospitilization cognitive status: Alert/Oriented (P)      Current cognitive status: Alert/Oriented (P)      Walking or Climbing Stairs Difficulty no (P)      Dressing/Bathing Difficulty no (P)      Home Accessibility wheelchair accessible (P)      Home Layout Able to live on 1st floor (P)      Equipment Currently Used at Home none (P)      Readmission within 30 days? No (P)      Patient currently being followed by outpatient case management? No (P)      Do you currently have service(s) that help you manage your care at home? No (P)      Do you take prescription medications? No (P)      Do you have prescription coverage? Yes (P)      Coverage [employer] Open Choice (P)      Do you have any problems affording any of your prescribed medications? No (P)      Is the patient taking medications as prescribed? -- (P)    pt's not on any prescription meds currently    Who is going to help you get home at discharge? Nessa Quigley(wife)[phone] (P)      How do you get to doctors appointments? car, drives self;family or friend will provide (P)      Are you on dialysis? No (P)      Do you take coumadin? No (P)      Discharge Plan A Home with family (P)      Discharge Plan B Home Health (P)      DME Needed Upon Discharge  none (P)      Discharge Plan discussed with: Spouse/sig other;Patient (P)      Name(s) and Number(s) Nessa Quigley(wife)[phone] (P)      Transition of Care Barriers None (P)         Physical Activity    On average, how many days per week do you engage in moderate to strenuous exercise (like a brisk walk)? 0 days (P)      On average, how many minutes do you engage in exercise at this level? 0 min (P)         Financial Resource Strain    How hard is it for you  to pay for the very basics like food, housing, medical care, and heating? Not hard at all (P)         Housing Stability    In the last 12 months, was there a time when you were not able to pay the mortgage or rent on time? No (P)      At any time in the past 12 months, were you homeless or living in a shelter (including now)? No (P)         Transportation Needs    In the past 12 months, has lack of transportation kept you from medical appointments or from getting medications? No (P)      In the past 12 months, has lack of transportation kept you from meetings, work, or from getting things needed for daily living? No (P)         Food Insecurity    Within the past 12 months, you worried that your food would run out before you got the money to buy more. Never true (P)      Within the past 12 months, the food you bought just didn't last and you didn't have money to get more. Never true (P)         Stress    Do you feel stress - tense, restless, nervous, or anxious, or unable to sleep at night because your mind is troubled all the time - these days? To some extent (P)         Alcohol Use    Q1: How often do you have a drink containing alcohol? Never (P)      Q2: How many drinks containing alcohol do you have on a typical day when you are drinking? Patient does not drink (P)      Q3: How often do you have six or more drinks on one occasion? Never (P)         OTHER    Name(s) of People in Home Nessa Quigley(wife)[phone] (P)

## 2024-05-03 NOTE — PLAN OF CARE
Pt AAOx4. PRN zofran given for c/o nausea. PRN oxy given for c/o pain. Medications administered per MAR. LR infusing. On RA. Lap sites x4 intact with dermabond. MYRON drain to bulb suction with sanguineous drainage. Output recorded. Suresh to gravity, draining clear yellow urine. Ambulated in hallway x2. Bed alarm set, side rails raised, and call light in reach. Spouse at bedside.

## 2024-05-03 NOTE — PROGRESS NOTES
The sw met with the pt and his wife in his room. The pt's wife will transport him home at d/c. The sw stressed the importance of going to his hsp f/u's and taking his medications. The pt acknowledged understanding and states he will comply. The pt has no further Case Management needs and is clear to d/c.     Future Appointments   Date Time Provider Department Center   5/14/2024  1:30 PM APPOINTMENT LAB, [facility]   5/14/2024  2:15 PM David Wong MD [facility] UROLOG[facility]    [facility] Med Surg  Discharge Final Note    Primary Care Provider: Pankaj Winters MD    Expected Discharge Date: 5/3/2024    Final Discharge Note (most recent)       Final Note - 05/03/24 1006          Final Note    Assessment Type Discharge Planning Assessment        Post-Acute Status    Coverage Aet[employer] Open Choice                     Important Message from Medicare             Contact Info       David Wong MD   Specialty: Urology    [address]  [address]  [place] [address]   Phone: [phone]       Next Steps: Follow up on 5/14/2024    Instructions: 2:15pm [facility] F/U

## 2024-05-03 NOTE — DISCHARGE SUMMARY
[facility] Surg  Discharge Note  Short Stay    Procedure(s) (LRB):  ROBOTIC NEPHRECTOMY, PARTIAL (Left)      OUTCOME: Patient tolerated treatment/procedure well without complication and is now ready for discharge.    DISPOSITION: Home or Self Care    FINAL DIAGNOSIS:  Left renal mass    FOLLOWUP: In clinic    DISCHARGE INSTRUCTIONS:    Discharge Procedure Orders   Diet Adult Regular     Other restrictions (specify):   Order Comments: No heavy lifting >15lbs or strenuous activity for 4 weeks. No tub baths or swimming for 2 weeks. Walk daily. Do not drive while taking narcotics.     No driving until:   Order Comments: Off pain medications     No dressing needed     Notify your health care provider if you experience any of the following:  temperature >100.4     Notify your health care provider if you experience any of the following:  persistent nausea and vomiting or diarrhea     Notify your health care provider if you experience any of the following:  severe uncontrolled pain     Notify your health care provider if you experience any of the following:   Order Comments: Keep wounds clean and dry. You may shower tomorrow.  Keep wounds clean and dry. You may shower tomorrow.  Do not remove surgical glue, it will fall off on its own.    Call the clinic at [phone] or return to the emergency department if you  have persistent fever >101.4, severe bleeding that does not stop in urine, pain not controlled with medications, severe nausea and vomiting limiting intake of liquids and solids, or any other concerns.         Clinical Reference Documents Added to Patient Instructions         Document    DOCUSATE, ADULT (ENGLISH)    GENERAL ANESTHESIA DISCHARGE INSTRUCTIONS (ENGLISH)    UMAIR-MABRY DRAIN (ENGLISH)    NEPHRECTOMY DISCHARGE INSTRUCTIONS (ENGLISH)    OXYCODONE AND ACETAMINOPHEN, ADULT (ENGLISH)            TIME SPENT ON DISCHARGE: 5 minutes

## 2024-05-05 ENCOUNTER — NURSE TRIAGE (OUTPATIENT)
Dept: ADMINISTRATIVE | Facility: CLINIC | Age: 64
End: 2024-05-05
Payer: COMMERCIAL

## 2024-05-05 NOTE — TELEPHONE ENCOUNTER
Pt had partial nephrectomy with Dr Dennison on Thursday. Pt showered today for 1st time. There is surgical tape over one of the insertion sites that was used to secure gauze over where the MYRON drain was removed prior to discharge. Pt's wife concerned that the site under the tape should be open to air, it was closed with surgical glue. Verified discharge paperwork for site care. Care advice to treat at home. Instructed pt's wife to gently remove tape with water carefully securing the skin from pulling. Instructed to call back with additional questions or concerns. Pt and his wife verified understanding.   Reason for Disposition   Skin tape (e.g., Steri-Strips) removal, questions about    Additional Information   Negative: [1] Major abdominal surgical incision AND [2] wound gaping open AND [3] visible internal organs   Negative: Sounds like a life-threatening emergency to the triager   Negative: [1] Bleeding from incision AND [2] won't stop after 10 minutes of direct pressure   Negative: [1] Bleeding (more than a few drops) from incision AND [2] tracheostomy or blood vessel surgery (e.g., carotid endarterectomy, femoral bypass graft, kidney dialysis fistula)   Negative: [1] Widespread rash AND [2] bright red, sunburn-like   Negative: Severe pain in the incision   Negative: [1] Incision gaping open AND [2] < 48 hours since wound re-opened   Negative: [1] Incision gaping open AND [2] length of opening > 2 inches (5 cm)   Negative: Patient sounds very sick or weak to the triager   Negative: Sounds like a serious complication to the triager   Negative: Fever > 100.4 F (38.0 C)   Negative: [1] Incision looks infected (spreading redness, pain) AND [2] fever > 99.5 F (37.5 C)   Negative: [1] Incision looks infected (spreading redness, pain) AND [2] large red area (> 2 in. or 5 cm)   Negative: [1] Incision looks infected (spreading redness, pain) AND [2] face wound   Negative: [1] Red streak runs from the incision AND [2]  longer than 1 inch (2.5 cm)   Negative: [1] Pus or bad-smelling fluid draining from incision AND [2] no fever   Negative: [1] Post-op pain AND [2] not controlled with pain medications   Negative: Dressing soaked with blood or body fluid (e.g., drainage)   Negative: [1] Scant bleeding (e.g., few drops) from incision AND AND [2] tracheostomy or blood vessel surgery (e.g., carotid endarterectomy, femoral bypass graft, kidney dialysis fistula)   Negative: [1] Raised bruise and [2] size > 2 inches (5 cm) and expanding   Negative: [1] Caller has URGENT question AND [2] triager unable to answer question   Negative: [1] INCREASING pain in incision AND [2] > 2 days (48 hours) since surgery   Negative: [1] Small red area or streak AND [2] no fever   Negative: [1] Clear or blood-tinged fluid draining from wound AND [2] no fever   Negative: [1] Incision gaping open AND [2] > 48 hours since wound re-opened AND [3] length of opening > 1/2 inch (12 mm)   Negative: [1] Incision on face gaping open AND [2] > 48 hours since wound re-opened AND [3] length of opening > 1/4 inch (6 mm)   Negative: Suture or staple removal is overdue   Negative: [1] Suture or staple came out early AND [2] caller wants wound checked   Negative: [1] Caller has NON-URGENT question AND [2] triager unable to answer question   Negative: Pimple where a stitch comes through the skin   Negative: Suture (or staple) came out early   Negative: Mild bruising near incision site   Negative: Suture removal date, questions about    Protocols used: Post-Op Incision Symptoms and [id number]

## 2024-05-07 ENCOUNTER — PATIENT MESSAGE (OUTPATIENT)
Dept: UROLOGY | Facility: CLINIC | Age: 64
End: 2024-05-07
Payer: COMMERCIAL

## 2024-05-09 ENCOUNTER — PATIENT MESSAGE (OUTPATIENT)
Dept: PRIMARY CARE CLINIC | Facility: CLINIC | Age: 64
End: 2024-05-09
Payer: COMMERCIAL

## 2024-05-09 LAB
COMMENT: NORMAL
FINAL PATHOLOGIC DIAGNOSIS: NORMAL
GROSS: NORMAL
Lab: NORMAL

## 2024-05-13 PROBLEM — C64.2 CLEAR CELL RENAL CELL CARCINOMA, LEFT: Status: ACTIVE | Noted: 2024-03-18

## 2024-05-13 NOTE — PROGRESS NOTES
Subjective:       Patient ID: Lopez Quigley is a 63 y.o. male.    Chief Complaint: s/p partial nephrectomy     This is a 63 y.o.  male patient with left renal mass, kidney stones.  CT without contrast 3/12/24 with left 2 mm UVJ stone with mild hydro, left 2.2 cm renal lesion.  Right-sided small nonobstructing stones.  Intermittent left-sided flank discomfort.  No fever or chills.  Labs 03/18/24 creatinine okay at 1.2, white blood cell count normal.  PSA was 4.1.  CT w/wo contrast 3/26/24 with left 2 cm renal mass, no left ureteral stone or hydronephrosis, right non obstructing small stones.    Still intermittent left lower abdominal cramping.      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    Doing well.      LAST PSA  Lab Results   Component Value Date    PSA 4.1 (H) 03/18/2024       Lab Results   Component Value Date    CREATININE 1.6 (H) 05/03/2024       ---  PMH/PSH/Medications/Allergies/Social history reviewed and as in chart.    Review of Systems   Constitutional:  Negative for activity change, chills and fever.   HENT:  Negative for congestion.    Respiratory:  Negative for cough, chest tightness and shortness of breath.    Cardiovascular:  Negative for chest pain and palpitations.   Gastrointestinal:  Negative for abdominal distention, abdominal pain, nausea and vomiting.   Genitourinary:  Positive for flank pain. Negative for difficulty urinating, hematuria, penile pain, scrotal swelling and testicular pain.   Musculoskeletal:  Negative for gait problem.       Objective:      Physical Exam  Constitutional:       Appearance: Normal appearance.   HENT:      Head: Normocephalic.   Pulmonary:      Effort: Pulmonary effort is normal.      Breath sounds: Normal breath sounds.   Abdominal:      General: Abdomen is flat. Bowel sounds are normal.      Palpations: Abdomen is soft.       Tenderness: There is no abdominal tenderness. There is no right CVA tenderness, left CVA tenderness or guarding.   Musculoskeletal:         General: Normal range of motion.      Cervical back: Normal range of motion.   Skin:     General: Skin is warm.   Neurological:      Mental Status: He is alert.           Results for orders placed or performed during the hospital encounter of 03/26/24 (from the past 2160 hour(s))   CT Abdomen Pelvis W Wo Contrast    Narrative    EXAM:  CT ABDOMEN PELVIS W WO CONTRAST    CLINICAL HISTORY:          left renal lesion, left ureteral stone; [N20.1]-Calculus of ureter./[N28.9]-Disorder of kidney and ureter, unspecified.    TECHNIQUE: Helical CT images through the abdomen and pelvis were obtained before and after intravenous administration of  iodinated contrast. All CT scans at [this location] are performed using dose modulation techniques as appropriate to a performed exam including the following: automated exposure control; adjustment of the mA and/or kV according to patient size (this includes techniques or standardized protocols for targeted exams where dose is matched to indication / reason for exam; i.e. extremities or head); use of iterative reconstruction technique.    COMPARISON: None.    FINDINGS:    Lower chest: No significant abnormality.  Liver: Satisfactory appearance of the liver.  Gallbladder: Unremarkable gallbladder.  Biliary: No significant biliary ductal dilatation.  Pancreas: No significant abnormality of the pancreas, adrenal glands, and spleen.  Adrenal glands: Unremarkable.  Spleen: Unremarkable.  Kidneys/ureters: Cystic and solid exophytic 2.5 x 2.2 cm left renal mass anterior lower pole.  There is a too small to characterize hypodensity in the right kidney statistically representing a cyst.  No hydronephrosis on either side.  There are 2 nonobstructing right renal calculi, the largest measures 2 mm.  No renal calculus on the left.  No hydronephrosis on either  side.  No urothelial lesion in the renal pelves or proximal ureters.  The distal ureters and bladder are not included on the delayed imaging.  Bladder: Grossly unremarkable.  Reproductive: Prostate is enlarged, 5.1 cm.  Bilateral scrotal hydroceles.  Probable vasectomy clips.  Gastrointestinal: No bowel obstruction or inflammation. No intraperitoneal free air or significant free fluid.  The appendix has a normal appearance.  Retroperitoneum: No adenopathy.  Vascular: Nonaneurysmal aorta.  No acute vascular abnormality.  Osseous: No acute fracture or aggressive appearing osseous lesion. Degenerative disc disease most pronounced at the L3-L4 and L4-L5 levels.        Impression    1.    Enhancing cystic and solid left renal mass measures 2.5 x 2.2 cm.  Finding is consistent with malignancy.  No evidence of distal metastasis in the abdomen or pelvis.  2.    Small bilateral scrotal hydroceles.  3.    Other findings as above.    Finalized on: 3/26/2024 11:28 AM By:  Carmelo Hernandez MD  [facility]# [id number]      2024-03-26 11:30:36.957    [facility]   Results for orders placed or performed during the hospital encounter of 03/12/24 (from the past 2160 hour(s))   CT Renal Stone Study ABD Pelvis WO    Narrative    EXAMINATION:  CT RENAL STONE STUDY ABD PELVIS WO    CLINICAL HISTORY:  Flank pain, kidney stone suspected;    TECHNIQUE:  Low dose axial images, sagittal and coronal reformations were obtained from the lung bases to the pubic symphysis, Oral contrast was not administered.    COMPARISON:  None    FINDINGS:  Lower chest: Gynecomastia.    Heart: Normal in size. No pericardial effusion.  Moderate coronary artery calcification.    Lung Bases: Well aerated, without consolidation or pleural fluid.    Liver: Normal in size and attenuation, with no focal hepatic lesions.    Gallbladder: No calcified gallstones.    Bile Ducts: No evidence of dilated ducts.    Pancreas: No mass or peripancreatic fat stranding.    Spleen:  Unremarkable.    Adrenals: Unremarkable.    Kidneys/ Ureters: 2 mm obstructive stone at the left UVJ with resultant mild left-sided hydroureteronephrosis.  Heterogeneous 2.2 x 1.7 cm rounded lesion at the inferior pole left kidney.  Nonobstructive right nephrolithiasis.  Mild bilateral infectious or inflammatory left perinephric fat stranding, left greater than right.    Bladder: No evidence of wall thickening.    Reproductive organs: Enlarged prostate gland abuts the base  of the bladder.    GI Tract/Mesentery: Colonic diverticulosis, without diverticulitis.  No small bowel obstruction.    Peritoneal Space: No ascites. No free air.    Retroperitoneum: No significant adenopathy.    Abdominal wall: Small fat containing umbilical hernia.  Small bilateral fat containing inguinal hernias.    Vasculature: No significant atherosclerosis or aneurysm.    Bones: No acute fracture.      Impression    2 mm obstructive stone at the left UVJ with resultant mild left-sided hydroureteronephrosis.    Heterogeneous 2.2 x 1.7 cm rounded lesion at the inferior pole left kidney.  Recommend CT or MR renal mass protocol to exclude solid neoplasm.    Nonobstructive right nephrolithiasis.    Mild bilateral infectious or inflammatory left perinephric fat stranding, left greater than right.      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   Surgical margins are negative for tumor.       Assessment:     Problem Noted   Renal Mass 3/18/2024    2.2 cm left on CT noncontrast 3/24  2.5 cm enhancing cystic and solid mass on CT abd w/wo contrast 3/26/24  Left robotic assisted lap partial nephrectomy 5/2/24.   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           Plan:     Path  reviewed  Follow up in 1 months with labs   Follow up BMP        David Wong MD    The above referenced imaging and interpretations were personally reviewed.  Disclaimer: This note has been generated using voice-recognition software. There may be typographical errors that have been missed during proof-reading.             No

## 2024-05-14 ENCOUNTER — OFFICE VISIT (OUTPATIENT)
Dept: UROLOGY | Facility: CLINIC | Age: 64
End: 2024-05-14
Payer: COMMERCIAL

## 2024-05-14 ENCOUNTER — LAB VISIT (OUTPATIENT)
Dept: LAB | Facility: HOSPITAL | Age: 64
End: 2024-05-14
Attending: UROLOGY
Payer: COMMERCIAL

## 2024-05-14 VITALS
HEIGHT: 67 IN | SYSTOLIC BLOOD PRESSURE: 118 MMHG | WEIGHT: 167.88 LBS | DIASTOLIC BLOOD PRESSURE: 80 MMHG | BODY MASS INDEX: 26.35 KG/M2 | HEART RATE: 75 BPM

## 2024-05-14 DIAGNOSIS — N20.0 RENAL STONE: ICD-10-CM

## 2024-05-14 DIAGNOSIS — N20.1 LEFT URETERAL STONE: ICD-10-CM

## 2024-05-14 DIAGNOSIS — N28.89 RENAL MASS: Primary | ICD-10-CM

## 2024-05-14 LAB
ANION GAP SERPL CALC-SCNC: 11 MMOL/L (ref 8–16)
BUN SERPL-MCNC: 18 MG/DL (ref 8–23)
CALCIUM SERPL-MCNC: 9.5 MG/DL (ref 8.7–10.5)
CHLORIDE SERPL-SCNC: 106 MMOL/L (ref 95–110)
CO2 SERPL-SCNC: 25 MMOL/L (ref 23–29)
CREAT SERPL-MCNC: 1.3 MG/DL (ref 0.5–1.4)
EST. GFR  (NO RACE VARIABLE): >60 ML/MIN/1.73 M^2
GLUCOSE SERPL-MCNC: 71 MG/DL (ref 70–110)
POTASSIUM SERPL-SCNC: 4.1 MMOL/L (ref 3.5–5.1)
SODIUM SERPL-SCNC: 142 MMOL/L (ref 136–145)

## 2024-05-14 PROCEDURE — 80048 BASIC METABOLIC PNL TOTAL CA: CPT | Performed by: UROLOGY

## 2024-05-14 PROCEDURE — 36415 COLL VENOUS BLD VENIPUNCTURE: CPT | Performed by: UROLOGY

## 2024-05-14 PROCEDURE — 99999 PR PBB SHADOW E&M-EST. PATIENT-LVL III: CPT | Mod: PBBFAC,,, | Performed by: UROLOGY

## 2024-05-14 PROCEDURE — 99024 POSTOP FOLLOW-UP VISIT: CPT | Mod: S$GLB,,, | Performed by: UROLOGY

## 2024-05-14 RX ORDER — HYDROXYZINE HYDROCHLORIDE 25 MG/1
25 TABLET, FILM COATED ORAL
COMMUNITY
Start: 2024-05-10 | End: 2024-06-14

## 2024-05-14 RX ORDER — TRIAMCINOLONE ACETONIDE 1 MG/G
1 OINTMENT TOPICAL 2 TIMES DAILY
COMMUNITY
Start: 2024-05-10 | End: 2024-06-14

## 2024-06-13 NOTE — PROGRESS NOTES
Subjective:       Patient ID: Lopez Quigley is a 63 y.o. male.    Chief Complaint: Post-op Evaluation     This is a 63 y.o.  male patient with left renal mass, kidney stones.  CT without contrast 3/12/24 with left 2 mm UVJ stone with mild hydro, left 2.2 cm renal lesion.  Right-sided small nonobstructing stones.  Intermittent left-sided flank discomfort.  No fever or chills.  Labs 03/18/24 creatinine okay at 1.2, white blood cell count normal.  PSA was 4.1.  CT w/wo contrast 3/26/24 with left 2 cm renal mass, no left ureteral stone or hydronephrosis, right non obstructing small stones.    Still intermittent left lower abdominal cramping.      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    Doing well.      Labs  6/24: Creatinine 1.3, GFR greater than 60.  Near preop baseline, H and H okay.    LAST PSA  Lab Results   Component Value Date    PSA 4.1 (H) 03/18/2024       Lab Results   Component Value Date    CREATININE 1.3 06/13/2024       ---  PMH/PSH/Medications/Allergies/Social history reviewed and as in chart.    Review of Systems   Constitutional:  Negative for activity change, chills and fever.   HENT:  Negative for congestion.    Respiratory:  Negative for cough, chest tightness and shortness of breath.    Cardiovascular:  Negative for chest pain and palpitations.   Gastrointestinal:  Negative for abdominal distention, abdominal pain, nausea and vomiting.   Genitourinary:  Positive for flank pain. Negative for difficulty urinating, hematuria, penile pain, scrotal swelling and testicular pain.   Musculoskeletal:  Negative for gait problem.       Objective:      Physical Exam  Constitutional:       Appearance: Normal appearance.   HENT:      Head: Normocephalic.   Pulmonary:      Effort: Pulmonary effort is normal.      Breath sounds: Normal breath sounds.   Abdominal:      General:  Abdomen is flat. Bowel sounds are normal.      Palpations: Abdomen is soft.      Tenderness: There is no abdominal tenderness. There is no right CVA tenderness, left CVA tenderness or guarding.   Musculoskeletal:         General: Normal range of motion.      Cervical back: Normal range of motion.   Skin:     General: Skin is warm.   Neurological:      Mental Status: He is alert.           Results for orders placed or performed during the hospital encounter of 03/26/24 (from the past 2160 hour(s))   CT Abdomen Pelvis W Wo Contrast    Narrative    EXAM:  CT ABDOMEN PELVIS W WO CONTRAST    CLINICAL HISTORY:          left renal lesion, left ureteral stone; [N20.1]-Calculus of ureter./[N28.9]-Disorder of kidney and ureter, unspecified.    TECHNIQUE: Helical CT images through the abdomen and pelvis were obtained before and after intravenous administration of  iodinated contrast. All CT scans at [this location] are performed using dose modulation techniques as appropriate to a performed exam including the following: automated exposure control; adjustment of the mA and/or kV according to patient size (this includes techniques or standardized protocols for targeted exams where dose is matched to indication / reason for exam; i.e. extremities or head); use of iterative reconstruction technique.    COMPARISON: None.    FINDINGS:    Lower chest: No significant abnormality.  Liver: Satisfactory appearance of the liver.  Gallbladder: Unremarkable gallbladder.  Biliary: No significant biliary ductal dilatation.  Pancreas: No significant abnormality of the pancreas, adrenal glands, and spleen.  Adrenal glands: Unremarkable.  Spleen: Unremarkable.  Kidneys/ureters: Cystic and solid exophytic 2.5 x 2.2 cm left renal mass anterior lower pole.  There is a too small to characterize hypodensity in the right kidney statistically representing a cyst.  No hydronephrosis on either side.  There are 2 nonobstructing right renal calculi, the  largest measures 2 mm.  No renal calculus on the left.  No hydronephrosis on either side.  No urothelial lesion in the renal pelves or proximal ureters.  The distal ureters and bladder are not included on the delayed imaging.  Bladder: Grossly unremarkable.  Reproductive: Prostate is enlarged, 5.1 cm.  Bilateral scrotal hydroceles.  Probable vasectomy clips.  Gastrointestinal: No bowel obstruction or inflammation. No intraperitoneal free air or significant free fluid.  The appendix has a normal appearance.  Retroperitoneum: No adenopathy.  Vascular: Nonaneurysmal aorta.  No acute vascular abnormality.  Osseous: No acute fracture or aggressive appearing osseous lesion. Degenerative disc disease most pronounced at the L3-L4 and L4-L5 levels.        Impression    1.    Enhancing cystic and solid left renal mass measures 2.5 x 2.2 cm.  Finding is consistent with malignancy.  No evidence of distal metastasis in the abdomen or pelvis.  2.    Small bilateral scrotal hydroceles.  3.    Other findings as above.    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   Surgical margins are negative for tumor.       Assessment:     Problem Noted   Renal Mass 3/18/2024    2.2 cm left on CT noncontrast 3/24  2.5 cm enhancing cystic and solid mass on CT abd w/wo contrast 3/26/24  Left robotic assisted lap partial nephrectomy 5/2/24.   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           Plan:     Labs reviewed look good.  Follow up 1 year postop with CT scan, chest x-ray and labs.      David Wong MD    The above referenced imaging and interpretations were personally reviewed.  Disclaimer: This note has been generated using  voice-recognition software. There may be typographical errors that have been missed during proof-reading.

## 2024-06-14 ENCOUNTER — OFFICE VISIT (OUTPATIENT)
Dept: UROLOGY | Facility: CLINIC | Age: 64
End: 2024-06-14
Payer: COMMERCIAL

## 2024-06-14 VITALS
HEART RATE: 62 BPM | SYSTOLIC BLOOD PRESSURE: 117 MMHG | WEIGHT: 173.19 LBS | DIASTOLIC BLOOD PRESSURE: 74 MMHG | HEIGHT: 67 IN | BODY MASS INDEX: 27.18 KG/M2

## 2024-06-14 DIAGNOSIS — N28.89 RENAL MASS: Primary | ICD-10-CM

## 2024-06-14 PROCEDURE — 99999 PR PBB SHADOW E&M-EST. PATIENT-LVL III: CPT | Mod: PBBFAC,,, | Performed by: UROLOGY

## 2024-06-14 PROCEDURE — 99024 POSTOP FOLLOW-UP VISIT: CPT | Mod: S$GLB,,, | Performed by: UROLOGY

## 2024-06-17 PROBLEM — Z00.00 ANNUAL PHYSICAL EXAM: Status: RESOLVED | Noted: 2024-03-18 | Resolved: 2024-06-17

## 2024-10-24 ENCOUNTER — PATIENT MESSAGE (OUTPATIENT)
Dept: RESEARCH | Facility: HOSPITAL | Age: 64
End: 2024-10-24
Payer: COMMERCIAL

## 2025-05-05 ENCOUNTER — OFFICE VISIT (OUTPATIENT)
Dept: UROLOGY | Facility: CLINIC | Age: 65
End: 2025-05-05
Payer: COMMERCIAL

## 2025-05-05 ENCOUNTER — RESULTS FOLLOW-UP (OUTPATIENT)
Dept: UROLOGY | Facility: CLINIC | Age: 65
End: 2025-05-05

## 2025-05-05 ENCOUNTER — LAB VISIT (OUTPATIENT)
Dept: LAB | Facility: HOSPITAL | Age: 65
End: 2025-05-05
Attending: UROLOGY
Payer: COMMERCIAL

## 2025-05-05 VITALS — HEIGHT: 67 IN | WEIGHT: 178.56 LBS | BODY MASS INDEX: 28.02 KG/M2

## 2025-05-05 DIAGNOSIS — R97.20 ELEVATED PSA: ICD-10-CM

## 2025-05-05 DIAGNOSIS — N28.89 RENAL MASS: ICD-10-CM

## 2025-05-05 DIAGNOSIS — N28.89 RENAL MASS: Primary | ICD-10-CM

## 2025-05-05 DIAGNOSIS — R97.20 ELEVATED PSA: Primary | ICD-10-CM

## 2025-05-05 LAB
ABSOLUTE EOSINOPHIL (OHS): 0.07 K/UL
ABSOLUTE MONOCYTE (OHS): 0.4 K/UL (ref 0.3–1)
ABSOLUTE NEUTROPHIL COUNT (OHS): 3.64 K/UL (ref 1.8–7.7)
ANION GAP (OHS): 9 MMOL/L (ref 8–16)
BASOPHILS # BLD AUTO: 0.04 K/UL
BASOPHILS NFR BLD AUTO: 0.8 %
BUN SERPL-MCNC: 15 MG/DL (ref 8–23)
CALCIUM SERPL-MCNC: 9.6 MG/DL (ref 8.7–10.5)
CHLORIDE SERPL-SCNC: 108 MMOL/L (ref 95–110)
CO2 SERPL-SCNC: 24 MMOL/L (ref 23–29)
CREAT SERPL-MCNC: 1.4 MG/DL (ref 0.5–1.4)
ERYTHROCYTE [DISTWIDTH] IN BLOOD BY AUTOMATED COUNT: 11.8 % (ref 11.5–14.5)
GFR SERPLBLD CREATININE-BSD FMLA CKD-EPI: 57 ML/MIN/1.73/M2
GLUCOSE SERPL-MCNC: 100 MG/DL (ref 70–110)
HCT VFR BLD AUTO: 51.8 % (ref 40–54)
HGB BLD-MCNC: 17.2 GM/DL (ref 14–18)
IMM GRANULOCYTES # BLD AUTO: 0.02 K/UL (ref 0–0.04)
IMM GRANULOCYTES NFR BLD AUTO: 0.4 % (ref 0–0.5)
LYMPHOCYTES # BLD AUTO: 1.1 K/UL (ref 1–4.8)
MCH RBC QN AUTO: 30.2 PG (ref 27–31)
MCHC RBC AUTO-ENTMCNC: 33.2 G/DL (ref 32–36)
MCV RBC AUTO: 91 FL (ref 82–98)
NUCLEATED RBC (/100WBC) (OHS): 0 /100 WBC
PLATELET # BLD AUTO: 155 K/UL (ref 150–450)
PMV BLD AUTO: 10.9 FL (ref 9.2–12.9)
POTASSIUM SERPL-SCNC: 4.1 MMOL/L (ref 3.5–5.1)
PSA SERPL-MCNC: 5.16 NG/ML
RBC # BLD AUTO: 5.69 M/UL (ref 4.6–6.2)
RELATIVE EOSINOPHIL (OHS): 1.3 %
RELATIVE LYMPHOCYTE (OHS): 20.9 % (ref 18–48)
RELATIVE MONOCYTE (OHS): 7.6 % (ref 4–15)
RELATIVE NEUTROPHIL (OHS): 69 % (ref 38–73)
SODIUM SERPL-SCNC: 141 MMOL/L (ref 136–145)
WBC # BLD AUTO: 5.27 K/UL (ref 3.9–12.7)

## 2025-05-05 PROCEDURE — 80048 BASIC METABOLIC PNL TOTAL CA: CPT

## 2025-05-05 PROCEDURE — 1160F RVW MEDS BY RX/DR IN RCRD: CPT | Mod: CPTII,S$GLB,, | Performed by: UROLOGY

## 2025-05-05 PROCEDURE — 3008F BODY MASS INDEX DOCD: CPT | Mod: CPTII,S$GLB,, | Performed by: UROLOGY

## 2025-05-05 PROCEDURE — 36415 COLL VENOUS BLD VENIPUNCTURE: CPT

## 2025-05-05 PROCEDURE — 1159F MED LIST DOCD IN RCRD: CPT | Mod: CPTII,S$GLB,, | Performed by: UROLOGY

## 2025-05-05 PROCEDURE — 85025 COMPLETE CBC W/AUTO DIFF WBC: CPT

## 2025-05-05 PROCEDURE — 99999 PR PBB SHADOW E&M-EST. PATIENT-LVL III: CPT | Mod: PBBFAC,,, | Performed by: UROLOGY

## 2025-05-05 PROCEDURE — 99214 OFFICE O/P EST MOD 30 MIN: CPT | Mod: S$GLB,,, | Performed by: UROLOGY

## 2025-05-05 PROCEDURE — 84153 ASSAY OF PSA TOTAL: CPT

## 2025-05-05 NOTE — PROGRESS NOTES
Subjective:       Patient ID: Lopez Quigley is a 64 y.o. male.    Chief Complaint: Establish Care     This is a 64 y.o.  male patient with left renal mass, kidney stones.  CT without contrast 3/12/24 with left 2 mm UVJ stone with mild hydro, left 2.2 cm renal lesion.  Right-sided small nonobstructing stones.  Intermittent left-sided flank discomfort.  No fever or chills.  Labs 03/18/24 creatinine okay at 1.2, white blood cell count normal.  PSA was 4.1.  CT w/wo contrast 3/26/24 with left 2 cm renal mass, no left ureteral stone or hydronephrosis, right non obstructing small stones.    Still intermittent left lower abdominal cramping.      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    Doing well.      Today he is doing well. No recent imaging or labs. No significant LUTS.    Labs  6/24: Creatinine 1.3, GFR greater than 60.  Near preop baseline, H and H okay.    LAST PSA  Lab Results   Component Value Date    PSA 4.1 (H) 03/18/2024       Lab Results   Component Value Date    CREATININE 1.3 06/13/2024       ---  PMH/PSH/Medications/Allergies/Social history reviewed and as in chart.    Review of Systems   Constitutional:  Negative for activity change, chills and fever.   HENT:  Negative for congestion.    Respiratory:  Negative for cough, chest tightness and shortness of breath.    Cardiovascular:  Negative for chest pain and palpitations.   Gastrointestinal:  Negative for abdominal distention, abdominal pain, nausea and vomiting.   Genitourinary:  Positive for flank pain. Negative for difficulty urinating, hematuria, penile pain, scrotal swelling and testicular pain.   Musculoskeletal:  Negative for gait problem.       Objective:      Physical Exam  Constitutional:       Appearance: Normal appearance.   HENT:      Head: Normocephalic.   Pulmonary:      Effort: Pulmonary effort is normal.       Breath sounds: Normal breath sounds.   Abdominal:      General: Abdomen is flat. Bowel sounds are normal.      Palpations: Abdomen is soft.      Tenderness: There is no abdominal tenderness. There is no right CVA tenderness, left CVA tenderness or guarding.   Musculoskeletal:         General: Normal range of motion.      Cervical back: Normal range of motion.   Skin:     General: Skin is warm.   Neurological:      Mental Status: He is alert.           No results found for this or any previous visit (from the past 2160 hours).      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   Surgical margins are negative for tumor.       Assessment:     Problem Noted   Elevated Psa 5/5/2025         Plan:     CT AP w wo, chest xray, bmp, cbc, PSA ordered today  Follow up 1 year postop with CT scan, chest x-ray and labs.  Prior PSA slight elevation. Repeat PSA today to show us two data points. If elevated, discussed prostate MRI. Will call with results.      Neil King MD    The above referenced imaging and interpretations were personally reviewed.  Disclaimer: This note has been generated using voice-recognition software. There may be typographical errors that have been missed during proof-reading.

## 2025-05-12 ENCOUNTER — PATIENT MESSAGE (OUTPATIENT)
Dept: UROLOGY | Facility: CLINIC | Age: 65
End: 2025-05-12
Payer: COMMERCIAL

## 2025-05-15 ENCOUNTER — HOSPITAL ENCOUNTER (OUTPATIENT)
Dept: RADIOLOGY | Facility: HOSPITAL | Age: 65
Discharge: HOME | End: 2025-05-15
Attending: UROLOGY
Payer: COMMERCIAL

## 2025-05-15 DIAGNOSIS — R97.20 ELEVATED PSA: ICD-10-CM

## 2025-05-15 PROCEDURE — 72197 MRI PELVIS W/O & W/DYE: CPT | Mod: 26,,, | Performed by: RADIOLOGY

## 2025-05-15 PROCEDURE — 25500020 PHARM REV CODE 255: Performed by: UROLOGY

## 2025-05-15 PROCEDURE — A9585 GADOBUTROL INJECTION: HCPCS | Performed by: UROLOGY

## 2025-05-15 PROCEDURE — 72197 MRI PELVIS W/O & W/DYE: CPT | Mod: TC

## 2025-05-15 RX ORDER — GADOBUTROL 604.72 MG/ML
10 INJECTION INTRAVENOUS
Status: COMPLETED | OUTPATIENT
Start: 2025-05-15 | End: 2025-05-15

## 2025-05-15 RX ADMIN — GADOBUTROL 10 ML: 604.72 INJECTION INTRAVENOUS at 12:05

## 2025-05-26 ENCOUNTER — OFFICE VISIT (OUTPATIENT)
Dept: UROLOGY | Facility: CLINIC | Age: 65
End: 2025-05-26
Payer: COMMERCIAL

## 2025-05-26 VITALS
HEART RATE: 75 BPM | DIASTOLIC BLOOD PRESSURE: 78 MMHG | HEIGHT: 67 IN | WEIGHT: 175.69 LBS | SYSTOLIC BLOOD PRESSURE: 117 MMHG | BODY MASS INDEX: 27.57 KG/M2

## 2025-05-26 DIAGNOSIS — R97.20 ELEVATED PSA: Primary | ICD-10-CM

## 2025-05-26 DIAGNOSIS — N28.89 RENAL MASS: ICD-10-CM

## 2025-05-26 PROCEDURE — G2211 COMPLEX E/M VISIT ADD ON: HCPCS | Mod: S$GLB,,, | Performed by: UROLOGY

## 2025-05-26 PROCEDURE — 3078F DIAST BP <80 MM HG: CPT | Mod: CPTII,S$GLB,, | Performed by: UROLOGY

## 2025-05-26 PROCEDURE — 1160F RVW MEDS BY RX/DR IN RCRD: CPT | Mod: CPTII,S$GLB,, | Performed by: UROLOGY

## 2025-05-26 PROCEDURE — 99999 PR PBB SHADOW E&M-EST. PATIENT-LVL III: CPT | Mod: PBBFAC,,, | Performed by: UROLOGY

## 2025-05-26 PROCEDURE — 1159F MED LIST DOCD IN RCRD: CPT | Mod: CPTII,S$GLB,, | Performed by: UROLOGY

## 2025-05-26 PROCEDURE — 3008F BODY MASS INDEX DOCD: CPT | Mod: CPTII,S$GLB,, | Performed by: UROLOGY

## 2025-05-26 PROCEDURE — 99214 OFFICE O/P EST MOD 30 MIN: CPT | Mod: S$GLB,,, | Performed by: UROLOGY

## 2025-05-26 PROCEDURE — 3074F SYST BP LT 130 MM HG: CPT | Mod: CPTII,S$GLB,, | Performed by: UROLOGY

## 2025-05-26 NOTE — PROGRESS NOTES
[facility]rology   Clinic Note    SUBJECTIVE:     Chief Complaint   Patient presents with    Results       Referral from: No ref. provider found.    History of Present Illness:  Lopez Quigley is a 64 y.o. male who presents to clinic for renal mass and elevated PSA.    This is a 64 y.o.  male patient with left renal mass, kidney stones.  CT without contrast 3/12/24 with left 2 mm UVJ stone with mild hydro, left 2.2 cm renal lesion.  Right-sided small nonobstructing stones.  Intermittent left-sided flank discomfort.  No fever or chills.  Labs 03/18/24 creatinine okay at 1.2, white blood cell count normal.  PSA was 4.1.  CT w/wo contrast 3/26/24 with left 2 cm renal mass, no left ureteral stone or hydronephrosis, right non obstructing small stones.    Still intermittent left lower abdominal cramping.       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    Doing well.       Today he is doing well. No recent imaging or labs. No significant LUTS.     Labs  6/24: Creatinine 1.3, GFR greater than 60.  Near preop baseline, H and H okay.    05/26/2025  Here to review imaging  CT: no evidence of recurrence  Prostate MRI: 41cc, PIRADS 2, PSA 5.1, PSA density 0.12    Patient endorses no additional complaints at this time.    Past Medical History:   Diagnosis Date    Kidney stone     Renal lesion 03/18/2024       Past Surgical History:   Procedure Laterality Date    ROBOT-ASSISTED LAPAROSCOPIC PARTIAL NEPHRECTOMY Left 5/2/2024    Procedure: ROBOTIC NEPHRECTOMY, PARTIAL;  Surgeon: David Wong MD;  Location: [facility];  Service: Urology;  Laterality: Left;  Intraoperative ultrasound with interpretation, US from [facility]: [id number] AM 3/28/24       No family history on file.    Social History[1]    Medications Ordered Prior to Encounter[2]    Review of patient's allergies indicates:  No Known  "Allergies    Review of Systems:  A review of 10+ systems was conducted with pertinent positive and negative findings documented in HPI with all other systems reviewed and negative.    OBJECTIVE:     Estimated body mass index is 27.52 kg/m² as calculated from the following:    Height as of this encounter: 5' 7" (1.702 m).    Weight as of this encounter: 79.7 kg (175 lb 11.3 oz).    Vital Signs (Most Recent)  Vitals:    05/26/25 0924   BP: 117/78   Pulse: 75       Physical Exam:  GENERAL: patient sitting comfortably  HEENT: normocephalic  NECK: supple, no JVD  PULM: normal chest rise, no increased WOB  HEART: non-diaphoretic  ABDO: soft, nondistended, nontender  BACK: no CVA tenderness bilaterally  SKIN: warm, dry, well perfused  EXT: no bruising or edema  NEURO: grossly normal with no focal deficits  PSYCH: appropriate mood and affect    Genitourinary Exam:  deferred    Lab Results   Component Value Date    BUN 15 05/05/2025    CREATININE 1.4 05/05/2025    WBC 5.27 05/05/2025    HGB 17.2 05/05/2025    HCT 51.8 05/05/2025     05/05/2025    AST 18 05/01/2024    ALT 21 05/01/2024    ALKPHOS 72 05/01/2024    ALBUMIN 3.8 05/01/2024    INR 1.0 05/01/2024        Imaging:  I have personally reviewed all relevant imaging studies.    Results for orders placed or performed during the hospital encounter of 05/09/25 (from the past 2160 hours)   CT Abdomen Pelvis W Wo Contrast    Narrative    EXAMINATION:  CT ABDOMEN PELVIS W WO CONTRAST    CLINICAL HISTORY:  Kidney cancer, staging;Other specified disorders of kidney and ureter    TECHNIQUE:  Low dose axial images, sagittal and coronal reformations were obtained from the lung bases to the pubic symphysis before and following the IV administration of 100 mL of Omnipaque 350.  Oral contrast was not administered.    COMPARISON:  03/26/2024.    FINDINGS:  Lung bases: Clear.  No infiltrates and no nodules.    Abdomen:    - Liver: No focal mass.    - Gallbladder: No calcified " gallstones.    - Bile Ducts: No evidence of intra or extra hepatic biliary ductal dilation.    - Spleen: Negative.    - Kidneys: Interval postoperative changes of partial nephrectomy in the anterior lower pole of the left kidney.  No recurrent renal mass.  Punctate nonobstructing calculi in the right kidney.  11 mm cyst in the midpole right kidney.  No hydronephrosis.    - Adrenals: Unremarkable.    - Pancreas: No mass or peripancreatic fat stranding.    - Retroperitoneum:  No significant adenopathy.    - Vascular: No aneurysm.    - Abdominal wall:  Fat containing periumbilical hernia.    Pelvis:    Enlarged prostate gland.  No pelvic mass, adenopathy, or free fluid.    Bowel/Mesentery:    No evidence of bowel obstruction or inflammation.    Bones:  No acute osseous abnormality and no suspicious lytic or blastic lesion.      Impression    Interim postoperative changes of partial left nephrectomy.  No evidence of residual or recurrent renal mass.      Electronically signed by: SIDRA Huang MD  Date:    05/09/2025  Time:    15:29     Results for orders placed or performed during the hospital encounter of 05/15/25 (from the past 2160 hours)   MRI Prostate W W/O Contrast    Impression    Benign prostatic hyperplasia with no suspicious focal lesion.    Overall Assessment: PI-RADS 2 - Low (clinically significant cancer is unlikely to be present)    Number of targets created for potential MR/US fusion biopsy    Peripheral zone: 0    Transition zone: 0    Electronically signed by resident: Mally Hoff  Date:    05/15/2025  Time:    14:00    Electronically signed by: Daryl Robertson MD  Date:    05/15/2025  Time:    16:10     MRI Prostate W W/O Contrast  Narrative: EXAMINATION:  MRI PROSTATE W W/O CONTRAST    CLINICAL HISTORY:  Prostate cancer suspected;  Elevated prostate specific antigen (PSA)    Additional history: None provided.    TECHNIQUE:  Multiparametric MRI of the prostate/pelvis performed on a 3T scanner with  "phase pelvic coil. Multiplanar, multisequence images including high resolution, small field-of-view T2-WI; axial diffusion weighted images with multiple B-values and creation of ADC-maps; and dynamic contrast enhanced T1-weighted images through the prostate were obtained before, during, and after the administration of 10 cc intravenous gadolinium.    COMPARISON:  None.    FINDINGS:  Previous biopsy: None    PSA: 5.16 ng/mL (05/05/2025)    Prior therapy: None    Prostate: 4.8 x 3.9 x 4.1 cm corresponding to a computed volume of 41 cc.    Prostate density: 0.13    Peripheral zone: Diffuse mild T2 hypointensity, score 2.    Transitional zone: Benign prostatic hyperplasia without focal suspicious abnormality, score 2.    Neurovascular bundle: Normal    Seminal vesicles: Unremarkable.    Adjacent Organ Involvement: No evidence for urinary bladder or rectal invasion.    Lymphadenopathy: None.    Other Findings: Bilateral fat containing inguinal hernias.  Impression: Benign prostatic hyperplasia with no suspicious focal lesion.    Overall Assessment: PI-RADS 2 - Low (clinically significant cancer is unlikely to be present)    Number of targets created for potential MR/US fusion biopsy    Peripheral zone: 0    Transition zone: 0    Electronically signed by resident: Mally Hoff  Date:    05/15/2025  Time:    14:00    Electronically signed by: Daryl Robertson MD  Date:    05/15/2025  Time:    16:10       PSA:  Lab Results   Component Value Date    PSA 5.16 (H) 05/05/2025    PSA 4.1 (H) 03/18/2024       Testosterone:  No results found for: "TOTALTESTOST", "TESTOSTERONE"     ASSESSMENT     1. Elevated PSA    2. Renal mass        PLAN:     Left Clear Cell Papillary Renal Tumor s/p partial nephrectomy 5/2/24 (pT1a low risk)    - follow up with surveillance imaging         2. Elevated PSA    - Discussed options, including continuing to trend vs biopsy. MRI is reassuring. Pt wishes to continue to trend. Will follow q6mo, repeat MRI in " 12 mo     - Visit today included increased complexity associated with the ongoing care of the episodic problem renal mass, elevated psa which has been addressed and requires the longitudinal care of the patient due to the serious and/or complex managed problem of renal mass, elevated psa.     Segun Rogers MD  Urology  [facility]    Disclaimer: This note has been generated using voice-recognition software. There may be typographical errors that have been missed during proof-reading.          [1]   Social History  Tobacco Use    Smoking status: Never    Smokeless tobacco: Never   Substance Use Topics    Alcohol use: No    Drug use: No   [2]   No current outpatient medications on file prior to visit.     No current facility-administered medications on file prior to visit.

## 2025-06-30 ENCOUNTER — TELEPHONE (OUTPATIENT)
Dept: DERMATOLOGY | Facility: CLINIC | Age: 65
End: 2025-06-30
Payer: COMMERCIAL

## 2025-07-01 ENCOUNTER — TELEPHONE (OUTPATIENT)
Dept: DERMATOLOGY | Facility: CLINIC | Age: 65
End: 2025-07-01
Payer: COMMERCIAL

## (undated) DEVICE — SUT ABS CLIP LAPRA-TY CTD

## (undated) DEVICE — SUT MCRYL PLUS 4-0 PS2 27IN

## (undated) DEVICE — DYE ICG

## (undated) DEVICE — TAPE ADH MEDIPORE 4 X 10YDS

## (undated) DEVICE — DRAIN JACKSON PRATT GOLD

## (undated) DEVICE — SOL ELECTROLUBE ANTI-STIC

## (undated) DEVICE — CONTAINER MULTIPURP W/LID 16OZ

## (undated) DEVICE — SYS CLSR WND ENDOSCP XL

## (undated) DEVICE — NDL 22GA X1 1/2 REG BEVEL

## (undated) DEVICE — GLOVE BIOGEL PI MICRO INDIC 8

## (undated) DEVICE — SEALANT VISTASEAL FIBRIN 10ML

## (undated) DEVICE — SOL NS 1000CC

## (undated) DEVICE — KIT SURGIFLO HEMOSTATIC MATRIX

## (undated) DEVICE — NDL INSUF ULTRA VERESS 120MM

## (undated) DEVICE — SUT VLOC 90 3-0 V-20 NDL 6

## (undated) DEVICE — ELECTRODE REM PLYHSV RETURN 9

## (undated) DEVICE — TAPE SILK 3IN

## (undated) DEVICE — BAG TISSUE RETRIEVAL 5MM

## (undated) DEVICE — IRRIGATOR ENDOSCOPY DISP.

## (undated) DEVICE — APPLICATOR ENDOSCOPIC

## (undated) DEVICE — STAPLER SKIN PROXIMATE WIDE

## (undated) DEVICE — COVER TABLE HVY DTY 60X90IN

## (undated) DEVICE — APPLICATOR VISTASEAL FLEX 45CM

## (undated) DEVICE — SET TRI-LUMEN FILTERED TUBE

## (undated) DEVICE — SUT 0 VICRYL / UR6 (J603)

## (undated) DEVICE — SUT VICRYL CTD 2-0 GI 27 SH

## (undated) DEVICE — CLIP HEMO-LOK MLX LARGE LF

## (undated) DEVICE — ADHESIVE DERMABOND ADVANCED

## (undated) DEVICE — COVER TIP CURVED SCISSORS XI

## (undated) DEVICE — EVACUATOR WOUND BULB 100CC

## (undated) DEVICE — SUT PROLENE 4-0 RB-1 BL MO

## (undated) DEVICE — DRESSING AQUACEL SACRAL 9 X 9

## (undated) DEVICE — HEMOSTAT SURGICEL 4X8IN

## (undated) DEVICE — DRAPE STERI-DRAPE 1000 17X11IN

## (undated) DEVICE — COVER MAYO STND XL 30X57IN

## (undated) DEVICE — GELATIN SURGIPOWDER ABSORBABLE

## (undated) DEVICE — DRAPE COLUMN DAVINCI XI

## (undated) DEVICE — DRAPE ARM DAVINCI XI

## (undated) DEVICE — PORT AIRSEAL 12/120MM LPI

## (undated) DEVICE — Device

## (undated) DEVICE — GLOVE SURGICAL LATEX SZ 8

## (undated) DEVICE — PAD PINK TRENDELENBURG POS XL

## (undated) DEVICE — SUT ETHILON 2-0 PSLX 30IN

## (undated) DEVICE — TRAY FOLEY 16FR INFECTION CONT